# Patient Record
Sex: MALE | Race: WHITE | NOT HISPANIC OR LATINO | Employment: OTHER | URBAN - METROPOLITAN AREA
[De-identification: names, ages, dates, MRNs, and addresses within clinical notes are randomized per-mention and may not be internally consistent; named-entity substitution may affect disease eponyms.]

---

## 2017-01-03 ENCOUNTER — OFFICE VISIT (OUTPATIENT)
Dept: URGENT CARE | Facility: CLINIC | Age: 42
End: 2017-01-03
Payer: MEDICARE

## 2017-01-03 VITALS
HEIGHT: 72 IN | TEMPERATURE: 97 F | RESPIRATION RATE: 18 BRPM | OXYGEN SATURATION: 98 % | DIASTOLIC BLOOD PRESSURE: 80 MMHG | WEIGHT: 189.38 LBS | HEART RATE: 92 BPM | BODY MASS INDEX: 25.65 KG/M2 | SYSTOLIC BLOOD PRESSURE: 130 MMHG

## 2017-01-03 DIAGNOSIS — J40 SINOBRONCHITIS: Primary | ICD-10-CM

## 2017-01-03 DIAGNOSIS — J32.9 SINOBRONCHITIS: Primary | ICD-10-CM

## 2017-01-03 PROCEDURE — 99999 PR PBB SHADOW E&M-EST. PATIENT-LVL III: CPT | Mod: PBBFAC,,, | Performed by: PHYSICIAN ASSISTANT

## 2017-01-03 PROCEDURE — 99213 OFFICE O/P EST LOW 20 MIN: CPT | Mod: PBBFAC,PO | Performed by: PHYSICIAN ASSISTANT

## 2017-01-03 PROCEDURE — 99214 OFFICE O/P EST MOD 30 MIN: CPT | Mod: S$PBB,,, | Performed by: PHYSICIAN ASSISTANT

## 2017-01-03 RX ORDER — AMOXICILLIN AND CLAVULANATE POTASSIUM 875; 125 MG/1; MG/1
1 TABLET, FILM COATED ORAL 2 TIMES DAILY
Qty: 14 TABLET | Refills: 0 | Status: SHIPPED | OUTPATIENT
Start: 2017-01-03 | End: 2017-01-10

## 2017-01-03 NOTE — MR AVS SNAPSHOT
[facility]  [address] [facility]  [place] [address]  Phone: [phone]  Fax: [phone]                  Moris Palmer Jr.   1/3/2017 8:20 AM   Office Visit    Description:  Male : 1975   Provider:  Eleanor Schultz PA-C   Department:  [facility]           Reason for Visit     URI     Nasal Congestion           Diagnoses this Visit        Comments    Sinobronchitis    -  Primary            To Do List           Future Appointments        Provider Department Dept Phone    2017 8:10 AM PULMONARY LAB, [facility]- Pulmonary Function [facility] [phone]    2017 9:00 AM Yaritza Lucio NP [facility] - Pulmonary Services [phone]      Goals (5 Years of Data)     None      Follow-Up and Disposition     Return if symptoms worsen or fail to improve.       These Medications        Disp Refills Start End    amoxicillin-clavulanate 875-125mg (AUGMENTIN) 875-125 mg per tablet 14 tablet 0 1/3/2017 1/10/2017    Take 1 tablet by mouth 2 (two) times daily. - Oral    Pharmacy: [facility] [address] [place] [address] [address] AT [facility] OF [facility] Ph #: [phone]         [facility] On Call     [facility] On Call Nurse Care Line -  Assistance  Registered nurses in the [facility] On Call Center provide clinical advisement, health education, appointment booking, and other advisory services.  Call for this free service at [phone].             Medications           Message regarding Medications     Verify the changes and/or additions to your medication regime listed below are the same as discussed with your clinician today.  If any of these changes or additions are incorrect, please notify your healthcare provider.        START taking these NEW medications        Refills    amoxicillin-clavulanate 875-125mg (AUGMENTIN) 875-125 mg per tablet 0    Sig: Take 1 tablet by mouth 2 (two) times daily.    Class: Normal    Route: Oral            Verify that the below list of medications is an accurate representation of the medications you are currently taking.  If none reported, the list may be blank. If incorrect, please contact your healthcare provider. Carry this list with you in case of emergency.           Current Medications     albuterol 90 mcg/actuation inhaler Inhale 2 puffs into the lungs every 6 (six) hours as needed for Wheezing.    amitriptyline (ELAVIL) 25 MG tablet Take 25 mg by mouth nightly as needed for Insomnia.    baclofen (LIORESAL) 20 MG tablet Take 20 mg by mouth 3 (three) times daily.    buPROPion (WELLBUTRIN SR) 150 MG TBSR 12 hr tablet Take 1 tablet (150 mg total) by mouth 2 (two) times daily. Begin one daily x 3days, then one twice daily. Do not take 2nd dose after 6 pm.    fluticasone (FLONASE) 50 mcg/actuation nasal spray 2 sprays by Each Nare route once daily.    fluticasone-vilanterol (BREO) 200-25 mcg/dose DsDv diskus inhaler Inhale 1 puff into the lungs once daily.    gabapentin (NEURONTIN) 400 MG capsule Take 800 mg by mouth 3 (three) times daily.     losartan (COZAAR) 25 MG tablet TAKE 1 TABLET BY MOUTH EVERY DAY    NEXIUM 40 mg capsule TAKE ONE CAPSULE BY MOUTH DAILY    oxycodone-acetaminophen (PERCOCET)  mg per tablet Take 1 tablet by mouth every 4 (four) hours as needed for Pain.    tramadol (ULTRAM) 50 mg tablet Take 50 mg by mouth every 6 (six) hours as needed for Pain.    amoxicillin-clavulanate 875-125mg (AUGMENTIN) 875-125 mg per tablet Take 1 tablet by mouth 2 (two) times daily.           Clinical Reference Information           Vital Signs - Last Recorded  Most recent update: 1/3/2017  8:26 AM by Marcia Knight LPN    BP Pulse Temp Resp Ht Wt    130/80 (BP Location: Left arm, Patient Position: Sitting, BP Method: Manual) 92 97 °F (36.1 °C) (Tympanic) 18 6' (1.829 m) 85.9 kg (189 lb 6 oz)    SpO2 BMI             98% 25.68 kg/m2         Blood Pressure          Most Recent Value    BP  130/80       Allergies as of 1/3/2017     Asa [Aspirin]      Immunizations Administered on Date of Encounter - 1/3/2017     None      [facility] Sign-Up     Activating your [facility] account is as easy as 1-2-3!     1) Visit [email or web address], select Sign Up Now, enter this activation code and your date of birth, then select Next.  Activation code not generated  Current Patient Portal Status: Account disabled      2) Create a username and password to use when you visit [facility] in the future and select a security question in case you lose your password and select Next.    3) Enter your e-mail address and click Sign Up!    Additional Information  If you have questions, please e-mail [email or web address] or call [phone] to talk to our [facility] staff. Remember, [facility] is NOT to be used for urgent needs. For medical emergencies, dial 911.         Instructions      Bronchitis, Antibiotic Treatment (Adult)    Bronchitis is an infection of the air passages (bronchial tubes) in your lungs. It often occurs when you have a cold. This illness is contagious during the first few days and is spread through the air by coughing and sneezing, or by direct contact (touching the sick person and then touching your own eyes, nose, or mouth).  Symptoms of bronchitis include cough with mucus (phlegm) and low-grade fever. Bronchitis usually lasts 7 to 14 days. Mild cases can be treated with simple home remedies. More severe infection is treated with an antibiotic.  Home care  Follow these guidelines when caring for yourself at home:  · If your symptoms are severe, rest at home for the first 2 to 3 days. When you go back to your usual activities, don't let yourself get too tired.  · Do not smoke. Also avoid being exposed to secondhand smoke.  · You may use over-the-counter medicines to control fever or pain, unless another medicine was prescribed. (Note: If you have chronic liver or kidney disease or have ever had a stomach ulcer or  gastrointestinal bleeding, talk with your healthcare provider before using these medicines. Also talk to your provider if you are taking medicine to prevent blood clots.) Aspirin should never be given to anyone younger than 18 years of age who is ill with a viral infection or fever. It may cause severe liver or brain damage.  · Your appetite may be poor, so a light diet is fine. Avoid dehydration by drinking 6 to 8 glasses of fluids per day (such as water, soft drinks, sports drinks, juices, tea, or soup). Extra fluids will help loosen secretions in the nose and lungs.  · Over-the-counter cough, cold, and sore-throat medicines will not shorten the length of the illness, but they may be helpful to reduce symptoms. (Note: Do not use decongestants if you have high blood pressure.)  · Finish all antibiotic medicine. Do this even if you are feeling better after only a few days.  Follow-up care  Follow up with your healthcare provider, or as advised. If you had an X-ray or ECG (electrocardiogram), a specialist will review it. You will be notified of any new findings that may affect your care.  Note: If you are age 65 or older, or if you have a chronic lung disease or condition that affects your immune system, or you smoke, talk to your healthcare provider about having pneumococcal vaccinations and a yearly influenza vaccination (flu shot).  When to seek medical advice  Call your healthcare provider right away if any of these occur:  · Fever of 100.4°F (38°C) or higher  · Coughing up increased amounts of colored sputum  · Weakness, drowsiness, headache, facial pain, ear pain, or a stiff neck  Call 911, or get immediate medical care  Contact emergency services right away if any of these occur.  · Coughing up blood  · Worsening weakness, drowsiness, headache, or stiff neck  · Trouble breathing, wheezing, or pain with breathing  © 7170-1534 [employer]. [address] [place] [address], [place] [address]. All rights  reserved. This information is not intended as a substitute for professional medical care. Always follow your healthcare professional's instructions.      Complete all antibiotics as prescribed  Rest  Drink plenty of clear fluids--at least 64 ounces of water/juice  Normal saline nasal wash (example Ocean spray) to irrigate sinuses and for congestion/runny nose  Flonase or Nasonex to decrease inflammation  Tylenol or Ibuprofen for fever, headache and body aches  Mucinex to help thin secretions and reduce congestion  Cool mist humidifier/vaporizer  Warm salt water gargles for throat comfort  Chloraseptic spray or lozenges for throat comfort  Warm tea with honey  Practice good handwashing    See your PCP or go to ER if symptoms worsen or fail to improve with treatment.          Smoking Cessation     If you would like to quit smoking:   You may be eligible for free services if you are a [place] resident and started smoking cigarettes before September 1, 1988.  Call the Smoking Cessation [employer] (SCT) toll free at (316) 055-1278 or (819) 504-5896.   Call 9-800-QUIT-NOW if you do not meet the above criteria.

## 2017-01-03 NOTE — PROGRESS NOTES
Subjective:       Patient ID: Moris Palmer Jr. is a 41 y.o. male.    Chief Complaint: URI and Nasal Congestion    Cough   This is a recurrent problem. The current episode started 1 to 4 weeks ago (Had upper respiratory infection three weeks ago and completed antibiotics, was slowly improving but cough never resolved, now with worsening cough x 3 days with brown sputum). The problem has been rapidly worsening. The problem occurs constantly. The cough is productive of brown sputum. Associated symptoms include ear congestion, nasal congestion, postnasal drip, rhinorrhea and a sore throat. Pertinent negatives include no chest pain, chills, ear pain, fever, headaches, heartburn, hemoptysis, shortness of breath, sweats or wheezing (has not had to use his rescue inhaler). Risk factors for lung disease include smoking/tobacco exposure. He has tried nothing for the symptoms. The treatment provided no relief. His past medical history is significant for bronchitis and COPD (has mild obstruction, followed by lidya [facility]. At baseline does not have a chronic cough). There is no history of pneumonia.     Review of Systems   Constitutional: Negative for chills, fatigue, fever and unexpected weight change.   HENT: Positive for postnasal drip, rhinorrhea and sore throat. Negative for congestion, ear discharge, ear pain, sinus pressure, sneezing, trouble swallowing and voice change.    Eyes: Negative for pain and discharge.   Respiratory: Positive for cough. Negative for hemoptysis, chest tightness, shortness of breath and wheezing (has not had to use his rescue inhaler).    Cardiovascular: Negative for chest pain and leg swelling.   Gastrointestinal: Negative for abdominal pain, heartburn, nausea and vomiting.   Neurological: Negative for headaches.       Objective:      Visit Vitals    /80 (BP Location: Left arm, Patient Position: Sitting, BP Method: Manual)    Pulse 92    Temp 97 °F (36.1 °C) (Tympanic)     Resp 18    Ht 6' (1.829 m)    Wt 85.9 kg (189 lb 6 oz)    SpO2 98%    BMI 25.68 kg/m2     Physical Exam   Constitutional: He is oriented to person, place, and time. He appears well-developed and well-nourished. No distress.   HENT:   Head: Normocephalic and atraumatic.   Right Ear: External ear normal.   Left Ear: External ear normal.   Nose: Nose normal.   Mouth/Throat: Oropharynx is clear and moist. No oropharyngeal exudate.   Eyes: Conjunctivae and EOM are normal. Pupils are equal, round, and reactive to light.   Neck: Normal range of motion. Neck supple.   Cardiovascular: Normal rate, regular rhythm, normal heart sounds and intact distal pulses.  Exam reveals no gallop and no friction rub.    No murmur heard.  Pulmonary/Chest: Effort normal and breath sounds normal. No stridor. No respiratory distress. He has no wheezes. He has no rales. He exhibits no tenderness.   Lymphadenopathy:     He has no cervical adenopathy.   Neurological: He is alert and oriented to person, place, and time.   Skin: Skin is warm and dry. No rash noted. He is not diaphoretic.   Nursing note and vitals reviewed.      Assessment:       1. Sinobronchitis        Plan:       Sinobronchitis  -     amoxicillin-clavulanate 875-125mg (AUGMENTIN) 875-125 mg per tablet; Take 1 tablet by mouth 2 (two) times daily.  Dispense: 14 tablet; Refill: 0    Suspect developing sinusitis with concern for bacterial bronchitis given long duration and worsening sputum. No dyspnea or wheezing on exam, will hold off on steroids at this point. Patient advised to return to PCP if not noticing improvement in symptoms on antibiotics.    Complete all antibiotics as prescribed  Rest  Drink plenty of clear fluids--at least 64 ounces of water/juice  Normal saline nasal wash (example Ocean spray) to irrigate sinuses and for congestion/runny nose  Flonase or Nasonex to decrease inflammation  Tylenol or Ibuprofen for fever, headache and body aches  Mucinex to help thin  secretions and reduce congestion  Cool mist humidifier/vaporizer  Warm salt water gargles for throat comfort  Chloraseptic spray or lozenges for throat comfort  Warm tea with honey  Practice good handwashing    See your PCP or go to ER if symptoms worsen or fail to improve with treatment.         Heather Trant PA-C [facility]

## 2017-01-03 NOTE — PATIENT INSTRUCTIONS
Bronchitis, Antibiotic Treatment (Adult)    Bronchitis is an infection of the air passages (bronchial tubes) in your lungs. It often occurs when you have a cold. This illness is contagious during the first few days and is spread through the air by coughing and sneezing, or by direct contact (touching the sick person and then touching your own eyes, nose, or mouth).  Symptoms of bronchitis include cough with mucus (phlegm) and low-grade fever. Bronchitis usually lasts 7 to 14 days. Mild cases can be treated with simple home remedies. More severe infection is treated with an antibiotic.  Home care  Follow these guidelines when caring for yourself at home:  · If your symptoms are severe, rest at home for the first 2 to 3 days. When you go back to your usual activities, don't let yourself get too tired.  · Do not smoke. Also avoid being exposed to secondhand smoke.  · You may use over-the-counter medicines to control fever or pain, unless another medicine was prescribed. (Note: If you have chronic liver or kidney disease or have ever had a stomach ulcer or gastrointestinal bleeding, talk with your healthcare provider before using these medicines. Also talk to your provider if you are taking medicine to prevent blood clots.) Aspirin should never be given to anyone younger than 18 years of age who is ill with a viral infection or fever. It may cause severe liver or brain damage.  · Your appetite may be poor, so a light diet is fine. Avoid dehydration by drinking 6 to 8 glasses of fluids per day (such as water, soft drinks, sports drinks, juices, tea, or soup). Extra fluids will help loosen secretions in the nose and lungs.  · Over-the-counter cough, cold, and sore-throat medicines will not shorten the length of the illness, but they may be helpful to reduce symptoms. (Note: Do not use decongestants if you have high blood pressure.)  · Finish all antibiotic medicine. Do this even if you are feeling better after only a  few days.  Follow-up care  Follow up with your healthcare provider, or as advised. If you had an X-ray or ECG (electrocardiogram), a specialist will review it. You will be notified of any new findings that may affect your care.  Note: If you are age 65 or older, or if you have a chronic lung disease or condition that affects your immune system, or you smoke, talk to your healthcare provider about having pneumococcal vaccinations and a yearly influenza vaccination (flu shot).  When to seek medical advice  Call your healthcare provider right away if any of these occur:  · Fever of 100.4°F (38°C) or higher  · Coughing up increased amounts of colored sputum  · Weakness, drowsiness, headache, facial pain, ear pain, or a stiff neck  Call 911, or get immediate medical care  Contact emergency services right away if any of these occur.  · Coughing up blood  · Worsening weakness, drowsiness, headache, or stiff neck  · Trouble breathing, wheezing, or pain with breathing  © 9691-4931 [employer]. [address] [place] [address]. All rights reserved. This information is not intended as a substitute for professional medical care. Always follow your healthcare professional's instructions.      Complete all antibiotics as prescribed  Rest  Drink plenty of clear fluids--at least 64 ounces of water/juice  Normal saline nasal wash (example Ocean spray) to irrigate sinuses and for congestion/runny nose  Flonase or Nasonex to decrease inflammation  Tylenol or Ibuprofen for fever, headache and body aches  Mucinex to help thin secretions and reduce congestion  Cool mist humidifier/vaporizer  Warm salt water gargles for throat comfort  Chloraseptic spray or lozenges for throat comfort  Warm tea with honey  Practice good handwashing    See your PCP or go to ER if symptoms worsen or fail to improve with treatment.

## 2017-02-15 ENCOUNTER — OFFICE VISIT (OUTPATIENT)
Dept: URGENT CARE | Facility: CLINIC | Age: 42
End: 2017-02-15
Payer: MEDICARE

## 2017-02-15 VITALS
HEIGHT: 72 IN | WEIGHT: 190.06 LBS | SYSTOLIC BLOOD PRESSURE: 150 MMHG | DIASTOLIC BLOOD PRESSURE: 80 MMHG | BODY MASS INDEX: 25.74 KG/M2 | TEMPERATURE: 98 F

## 2017-02-15 DIAGNOSIS — M54.41 CHRONIC BILATERAL LOW BACK PAIN WITH RIGHT-SIDED SCIATICA: Primary | ICD-10-CM

## 2017-02-15 DIAGNOSIS — G89.29 CHRONIC BILATERAL LOW BACK PAIN WITH RIGHT-SIDED SCIATICA: Primary | ICD-10-CM

## 2017-02-15 PROCEDURE — 99213 OFFICE O/P EST LOW 20 MIN: CPT | Mod: PBBFAC,PO | Performed by: NURSE PRACTITIONER

## 2017-02-15 PROCEDURE — 99213 OFFICE O/P EST LOW 20 MIN: CPT | Mod: S$PBB,,, | Performed by: NURSE PRACTITIONER

## 2017-02-15 PROCEDURE — 96372 THER/PROPH/DIAG INJ SC/IM: CPT | Mod: PBBFAC,PO

## 2017-02-15 PROCEDURE — 99999 PR PBB SHADOW E&M-EST. PATIENT-LVL III: CPT | Mod: PBBFAC,,, | Performed by: NURSE PRACTITIONER

## 2017-02-15 RX ORDER — KETOROLAC TROMETHAMINE 30 MG/ML
60 INJECTION, SOLUTION INTRAMUSCULAR; INTRAVENOUS
Status: COMPLETED | OUTPATIENT
Start: 2017-02-15 | End: 2017-02-15

## 2017-02-15 RX ADMIN — KETOROLAC TROMETHAMINE 60 MG: 60 INJECTION, SOLUTION INTRAMUSCULAR at 04:02

## 2017-02-15 NOTE — PROGRESS NOTES
Subjective:       Patient ID: Moris Palmer Jr. is a 41 y.o. male.    Chief Complaint: Back Pain    HPI Comments: Denies recent injury or trauma.    Back Pain   This is a chronic problem. Episode onset: many years, but seems to have increased over last 48 hours. The problem occurs constantly. The problem has been gradually worsening since onset. The pain is present in the lumbar spine. The quality of the pain is described as aching. The pain radiates to the right knee. The pain is at a severity of 8/10. The symptoms are aggravated by bending, twisting, standing and position. Associated symptoms include leg pain. Pertinent negatives include no abdominal pain, bladder incontinence, bowel incontinence, chest pain, dysuria, fever, headaches, numbness, paresis, paresthesias, pelvic pain, perianal numbness, tingling, weakness or weight loss. Risk factors: chronic back pain, has had back surgeries in the past. Treatments tried: took 3 percoet today, also took 2 tramadol, baclofen, neurontin, and mobic. The treatment provided no relief.     Review of Systems   Constitutional: Positive for activity change. Negative for appetite change, chills, diaphoresis, fatigue, fever, unexpected weight change and weight loss.   HENT: Negative.    Eyes: Negative.    Respiratory: Negative for apnea, chest tightness, shortness of breath and stridor.    Cardiovascular: Negative for chest pain, palpitations and leg swelling.   Gastrointestinal: Negative.  Negative for abdominal pain and bowel incontinence.   Endocrine: Negative.    Genitourinary: Negative.  Negative for bladder incontinence, dysuria, flank pain, hematuria and pelvic pain.   Musculoskeletal: Positive for back pain. Negative for arthralgias, gait problem, joint swelling, myalgias, neck pain and neck stiffness.   Skin: Negative for color change, pallor, rash and wound.   Allergic/Immunologic: Negative.    Neurological: Negative for dizziness, tingling, facial asymmetry,  weakness, light-headedness, numbness, headaches and paresthesias.   Hematological: Negative for adenopathy.   Psychiatric/Behavioral: Negative for agitation and behavioral problems.       Objective:      Physical Exam   Constitutional: He is oriented to person, place, and time. He appears well-developed and well-nourished. No distress.   HENT:   Head: Normocephalic and atraumatic.   Cardiovascular: Normal rate, regular rhythm and normal heart sounds.    Pulmonary/Chest: Effort normal and breath sounds normal. No respiratory distress.   Musculoskeletal:        Lumbar back: He exhibits decreased range of motion, tenderness and pain. He exhibits no bony tenderness, no swelling, no edema, no deformity, no laceration, no spasm and normal pulse.        Back:    TTP to area of pain per drawing.  No swelling, erythema or rash.  No numbness or tingling.  Negative straight leg raise bilaterally. Midline surgical scar noted.     Neurological: He is alert and oriented to person, place, and time.   Skin: Skin is warm and dry. No rash noted. He is not diaphoretic.   Psychiatric: He has a normal mood and affect. His behavior is normal. Judgment and thought content normal.   Nursing note and vitals reviewed.      Assessment:       1. Chronic bilateral low back pain with right-sided sciatica        Plan:       Moris was seen today for back pain.    Diagnoses and all orders for this visit:    Chronic bilateral low back pain with right-sided sciatica  -     ketorolac injection 60 mg; Inject 2 mLs (60 mg total) into the muscle one time.    Patient on narcotic meds prescribed by Dr. Combs, he takes tramadol, percocet 10, gabapentin, baclofen and mobic. I explained to patient that we can give him a toradol injection today x 1 but he needs to follow up with Dr. Combs who prescribes these medications for further evaluation of his back pain. Patient declined physical therapy prescription today. ER if worse. No x ray indicated today as his  pain is chronic and there is no injury or trauma prior to back pain increasing in intensity 2 days ago.

## 2017-02-15 NOTE — MR AVS SNAPSHOT
[facility]  [address] [place] [address]  Phone: [phone]  Fax: [phone]                  Moris Palmer Jr.   2/15/2017 4:10 PM   Office Visit    Description:  Male : 1975   Provider:  JUAREZ Hay   Department:  [place] [facility]           Reason for Visit     Back Pain           Diagnoses this Visit        Comments    Chronic bilateral low back pain with right-sided sciatica    -  Primary            To Do List           Goals (5 Years of Data)     None      Follow-Up and Disposition     Return if symptoms worsen or fail to improve.      [facility]     [facility] On Call Nurse Care Line -  Assistance  Registered nurses in the [facility] Call Center provide clinical advisement, health education, appointment booking, and other advisory services.  Call for this free service at [phone].             Medications           Message regarding Medications     Verify the changes and/or additions to your medication regime listed below are the same as discussed with your clinician today.  If any of these changes or additions are incorrect, please notify your healthcare provider.        These medications were administered today        Dose Freq    ketorolac injection 60 mg 60 mg Clinic/HOD 1 time    Sig: Inject 2 mLs (60 mg total) into the muscle one time.    Class: Normal    Route: Intramuscular      STOP taking these medications     fluticasone (FLONASE) 50 mcg/actuation nasal spray 2 sprays by Each Nare route once daily.           Verify that the below list of medications is an accurate representation of the medications you are currently taking.  If none reported, the list may be blank. If incorrect, please contact your healthcare provider. Carry this list with you in case of emergency.           Current Medications     albuterol 90 mcg/actuation inhaler Inhale 2 puffs into the lungs every 6 (six) hours as needed for Wheezing.     amitriptyline (ELAVIL) 25 MG tablet Take 25 mg by mouth nightly as needed for Insomnia.    baclofen (LIORESAL) 20 MG tablet Take 20 mg by mouth 3 (three) times daily.    buPROPion (WELLBUTRIN SR) 150 MG TBSR 12 hr tablet Take 1 tablet (150 mg total) by mouth 2 (two) times daily. Begin one daily x 3days, then one twice daily. Do not take 2nd dose after 6 pm.    fluticasone-vilanterol (BREO) 200-25 mcg/dose DsDv diskus inhaler Inhale 1 puff into the lungs once daily.    gabapentin (NEURONTIN) 400 MG capsule Take 800 mg by mouth 3 (three) times daily.     losartan (COZAAR) 25 MG tablet TAKE 1 TABLET BY MOUTH EVERY DAY    NEXIUM 40 mg capsule TAKE ONE CAPSULE BY MOUTH DAILY    oxycodone-acetaminophen (PERCOCET)  mg per tablet Take 1 tablet by mouth every 4 (four) hours as needed for Pain.    tramadol (ULTRAM) 50 mg tablet Take 50 mg by mouth every 6 (six) hours as needed for Pain.           Clinical Reference Information           Your Vitals Were     BP Temp Height Weight BMI    150/80 (BP Location: Left arm, Patient Position: Sitting, BP Method: Manual) 98.1 °F (36.7 °C) (Tympanic) 6' (1.829 m) 86.2 kg (190 lb 0.6 oz) 25.77 kg/m2      Blood Pressure          Most Recent Value    BP  (!)  150/80      Allergies as of 2/15/2017     Asa [Aspirin]      Immunizations Administered on Date of Encounter - 2/15/2017     None      [facility] Sign-Up     Activating your [facility] account is as easy as 1-2-3!     1) Visit [email or web address], select Sign Up Now, enter this activation code and your date of birth, then select Next.  Activation code not generated  Current Patient Portal Status: Account disabled      2) Create a username and password to use when you visit [facility] in the future and select a security question in case you lose your password and select Next.    3) Enter your e-mail address and click Sign Up!    Additional Information  If you have questions, please e-mail [email or web address] or call [phone] to  talk to our [facility] staff. Remember, [facility] is NOT to be used for urgent needs. For medical emergencies, dial 911.         Instructions    Follow up with Dr. Combs for further evaluation of chronic back pain  ER if worse       Smoking Cessation     If you would like to quit smoking:   You may be eligible for free services if you are a [place] resident and started smoking cigarettes before September 1, 1988.  Call the Smoking Cessation Trust ([facility]) toll free at (393) 663-7373 or (007) 062-7468.   Call 1-800-QUIT-NOW if you do not meet the above criteria.            Language Assistance Services     ATTENTION: Language assistance services are available, free of charge. Please call [phone].      ATENCIÓN: Si habla marjan, tiene a gusman disposición servicios gratuitos de asistencia lingüística. Llame al [phone].     CHÚ Ý: N?u b?n nói Ti?ng Vi?t, có các d?ch v? h? tr? ngôn ng? mi?n phí dành cho b?n. G?i s? [phone].         [facility] complies with applicable Federal civil rights laws and does not discriminate on the basis of race, color, national origin, age, disability, or sex.

## 2017-02-20 ENCOUNTER — PATIENT OUTREACH (OUTPATIENT)
Dept: ADMINISTRATIVE | Facility: HOSPITAL | Age: 42
End: 2017-02-20

## 2017-02-20 DIAGNOSIS — Z13.29 SCREENING FOR THYROID DISORDER: ICD-10-CM

## 2017-02-20 DIAGNOSIS — I10 ESSENTIAL HYPERTENSION: Primary | ICD-10-CM

## 2017-02-20 NOTE — LETTER
February 20, 2017    Moris Palmer JrMegha  [address]  [place] [address]             [facility]  [address]  [place] [address]  Phone: [phone] Dear Mr. Palmer:       [facility] is committed to your overall health.  To help you get the most out of each of your visits, we will review your information to make sure you are up to date on all of your recommended tests and/or procedures.      Sascha Tilley MD has found that you may be due for   Health Maintenance Due   Topic    Pneumococcal PPSV23 (Medium Risk) (1)    Influenza Vaccine     Lipid Panel         If you have had any of the above done at another facility, please bring the records or information with you so that your record at [facility] will be complete.    If you are currently taking medication, please bring it with you to your appointment for review.    We will be happy to assist you with scheduling any necessary appointments or you may contact the [facility] appointment desk at [phone] to schedule at your convenience.     Thank you for choosing [facility] for your healthcare needs,    Luzmaria COLON LPN  Care Coordination Department  [facility]  [phone]

## 2017-02-20 NOTE — PROGRESS NOTES
CHART AUDIT COMPLETED. OVERDUE HEALTH MAINTENANCE LETTER MAILED TO PATIENT. ORDERS ENTERED VIA WOG.

## 2017-03-20 ENCOUNTER — OFFICE VISIT (OUTPATIENT)
Dept: INTERNAL MEDICINE | Facility: CLINIC | Age: 42
End: 2017-03-20
Payer: MEDICARE

## 2017-03-20 ENCOUNTER — LAB VISIT (OUTPATIENT)
Dept: LAB | Facility: HOSPITAL | Age: 42
End: 2017-03-20
Attending: FAMILY MEDICINE
Payer: MEDICARE

## 2017-03-20 VITALS
DIASTOLIC BLOOD PRESSURE: 88 MMHG | HEIGHT: 72 IN | BODY MASS INDEX: 25.56 KG/M2 | HEART RATE: 84 BPM | TEMPERATURE: 98 F | WEIGHT: 188.69 LBS | SYSTOLIC BLOOD PRESSURE: 120 MMHG

## 2017-03-20 DIAGNOSIS — I10 ESSENTIAL HYPERTENSION: ICD-10-CM

## 2017-03-20 DIAGNOSIS — Z13.29 SCREENING FOR THYROID DISORDER: ICD-10-CM

## 2017-03-20 DIAGNOSIS — K21.9 GASTROESOPHAGEAL REFLUX DISEASE, ESOPHAGITIS PRESENCE NOT SPECIFIED: Primary | ICD-10-CM

## 2017-03-20 LAB
ALBUMIN SERPL BCP-MCNC: 3.8 G/DL
ALP SERPL-CCNC: 55 U/L
ALT SERPL W/O P-5'-P-CCNC: 15 U/L
ANION GAP SERPL CALC-SCNC: 8 MMOL/L
AST SERPL-CCNC: 15 U/L
BILIRUB SERPL-MCNC: 0.5 MG/DL
BUN SERPL-MCNC: 19 MG/DL
CALCIUM SERPL-MCNC: 8.8 MG/DL
CHLORIDE SERPL-SCNC: 109 MMOL/L
CHOLEST/HDLC SERPL: 6.2 {RATIO}
CO2 SERPL-SCNC: 24 MMOL/L
CREAT SERPL-MCNC: 1 MG/DL
EST. GFR  (AFRICAN AMERICAN): >60 ML/MIN/1.73 M^2
EST. GFR  (NON AFRICAN AMERICAN): >60 ML/MIN/1.73 M^2
GLUCOSE SERPL-MCNC: 97 MG/DL
HDL/CHOLESTEROL RATIO: 16.1 %
HDLC SERPL-MCNC: 242 MG/DL
HDLC SERPL-MCNC: 39 MG/DL
LDLC SERPL CALC-MCNC: 177 MG/DL
NONHDLC SERPL-MCNC: 203 MG/DL
POTASSIUM SERPL-SCNC: 4.7 MMOL/L
PROT SERPL-MCNC: 6.8 G/DL
SODIUM SERPL-SCNC: 141 MMOL/L
TRIGL SERPL-MCNC: 130 MG/DL
TSH SERPL DL<=0.005 MIU/L-ACNC: 1.09 UIU/ML

## 2017-03-20 PROCEDURE — 80061 LIPID PANEL: CPT

## 2017-03-20 PROCEDURE — 99213 OFFICE O/P EST LOW 20 MIN: CPT | Mod: S$PBB,,, | Performed by: PHYSICIAN ASSISTANT

## 2017-03-20 PROCEDURE — 85025 COMPLETE CBC W/AUTO DIFF WBC: CPT

## 2017-03-20 PROCEDURE — 36415 COLL VENOUS BLD VENIPUNCTURE: CPT | Mod: PO

## 2017-03-20 PROCEDURE — 99999 PR PBB SHADOW E&M-EST. PATIENT-LVL III: CPT | Mod: PBBFAC,,, | Performed by: PHYSICIAN ASSISTANT

## 2017-03-20 PROCEDURE — 84443 ASSAY THYROID STIM HORMONE: CPT

## 2017-03-20 PROCEDURE — 80053 COMPREHEN METABOLIC PANEL: CPT

## 2017-03-20 RX ORDER — LOSARTAN POTASSIUM 25 MG/1
25 TABLET ORAL DAILY
Qty: 90 TABLET | Refills: 0 | Status: SHIPPED | OUTPATIENT
Start: 2017-03-20 | End: 2017-08-17 | Stop reason: SDUPTHER

## 2017-03-20 RX ORDER — PANTOPRAZOLE SODIUM 40 MG/1
40 TABLET, DELAYED RELEASE ORAL DAILY
Qty: 90 TABLET | Refills: 0 | Status: SHIPPED | OUTPATIENT
Start: 2017-03-20 | End: 2017-08-17 | Stop reason: SDUPTHER

## 2017-03-20 RX ORDER — GABAPENTIN 800 MG/1
1 TABLET ORAL 3 TIMES DAILY
Refills: 2 | COMMUNITY
Start: 2017-02-10

## 2017-03-20 NOTE — PROGRESS NOTES
Subjective:       Patient ID: Moris Palmer Jr. is a 41 y.o. male.    Chief Complaint: Annual Exam (refill meds)    HPI   Discontinued  wellbutrin . Was on it to help him quit smoking, did not help him so he discontinued the medication.   Diet: Diet ok. Generally healthy.  Exercise: walks   Hasn't been on his losartan due to no refills. Blood pressure at home has been running 140s/90s or greater. Denies headaches, blurred vision, or chest pain or SOB.   On breo for chronic cough, which helps.     Patient Active Problem List   Diagnosis    Essential hypertension - blood pressure doing great.     Chronic low back pain- See's Dr. Miner for this. Doing ok on current medications.    GERD: on nexium, but states it does not help with his reflux. Has had reflux for years. Has not visited GI.     Health Maintenance   Topic Date Due    Pneumococcal PPSV23 (Medium Risk) (1) 12/17/1993    Influenza Vaccine  08/01/2016    Lipid Panel  11/06/2016    TETANUS VACCINE  11/30/2021     Review of Systems   Constitutional: Negative for activity change, appetite change, chills, diaphoresis, fatigue, fever and unexpected weight change.   HENT: Negative.  Negative for congestion, hearing loss, postnasal drip, rhinorrhea, sore throat, trouble swallowing and voice change.    Eyes: Negative.  Negative for visual disturbance.   Respiratory: Negative.  Negative for cough, choking, chest tightness and shortness of breath.    Cardiovascular: Negative for chest pain, palpitations and leg swelling.   Gastrointestinal: Negative for abdominal distention, abdominal pain, blood in stool, constipation, diarrhea, nausea and vomiting.   Endocrine: Negative for cold intolerance, heat intolerance, polydipsia and polyuria.   Genitourinary: Negative.  Negative for difficulty urinating and frequency.   Musculoskeletal: Negative for arthralgias, back pain, gait problem, joint swelling and myalgias.   Skin: Negative for color change, pallor, rash  and wound.   Neurological: Negative for dizziness, tremors, weakness, light-headedness, numbness and headaches.   Hematological: Negative for adenopathy.   Psychiatric/Behavioral: Negative for behavioral problems, confusion, self-injury, sleep disturbance and suicidal ideas. The patient is not nervous/anxious.          /88  Pulse 84  Temp 97.5 °F (36.4 °C) (Tympanic)   Ht 6' (1.829 m)  Wt 85.6 kg (188 lb 11.4 oz)  BMI 25.59 kg/m2  Objective:      Physical Exam   Constitutional: He is oriented to person, place, and time. He appears well-developed and well-nourished.   HENT:   Head: Normocephalic and atraumatic.   Right Ear: External ear normal.   Left Ear: External ear normal.   Nose: Nose normal.   Mouth/Throat: Oropharynx is clear and moist.   Eyes: Conjunctivae and EOM are normal. Pupils are equal, round, and reactive to light.   Neck: Normal range of motion. Neck supple.   Cardiovascular: Normal rate, regular rhythm and normal heart sounds.  Exam reveals no gallop and no friction rub.    No murmur heard.  Pulmonary/Chest: Effort normal and breath sounds normal. No respiratory distress. He has no wheezes. He has no rales. He exhibits no tenderness.   Abdominal: Soft. He exhibits no distension. There is no tenderness.   Musculoskeletal: Normal range of motion.   Lymphadenopathy:     He has no cervical adenopathy.   Neurological: He is alert and oriented to person, place, and time.   Skin: Skin is warm and dry.   Psychiatric: He has a normal mood and affect. His behavior is normal. Judgment and thought content normal.   Vitals reviewed.        Assessment:       1. Gastroesophageal reflux disease, esophagitis presence not specified    2. Essential hypertension        Plan:   Labs to be completed today: TSH, CBC, CMP, lipids    Gastroesophageal reflux disease, esophagitis presence not specified  -     pantoprazole (PROTONIX) 40 MG tablet; Take 1 tablet (40 mg total) by mouth once daily.  Dispense: 90  tablet; Refill: 0  -discontinue nexium, will try protonix. -if no improvement, will need EGD    Essential hypertension  -     losartan (COZAAR) 25 MG tablet; Take 1 tablet (25 mg total) by mouth once daily.  Dispense: 90 tablet; Refill: 0  -monitor blood pressure at home. GOAL is under 140/90. If start getting dizzy when going from sitting to standing, RTC.     Return in about 6 months (around 9/20/2017), or if symptoms worsen or fail to improve.

## 2017-03-20 NOTE — MR AVS SNAPSHOT
[facility]  [address]  [place] [address]  Phone: [phone]  Fax: [phone]                  Moris Palmer Jr.   3/20/2017 8:00 AM   Office Visit    Description:  Male : 1975   Provider:  Isabella Bolton PA-C   [facility]           Reason for Visit     Annual Exam           Diagnoses this Visit        Comments    Gastroesophageal reflux disease, esophagitis presence not specified    -  Primary     Essential hypertension                To Do List           Future Appointments        Provider Department Dept Phone    3/20/2017 10:00 AM LABORATORY, [place] [facility] [phone]    2017 7:40 AM Sascha Tilley MD [facility] [phone]      Goals (5 Years of Data)     None      Follow-Up and Disposition     Return in about 6 months (around 2017), or if symptoms worsen or fail to improve.       These Medications        Disp Refills Start End    pantoprazole (PROTONIX) 40 MG tablet 90 tablet 0 3/20/2017 2017    Take 1 tablet (40 mg total) by mouth once daily. - Oral    Pharmacy: [facility] [id number] [place] [address] [address] [facility] Ph #: [phone]       losartan (COZAAR) 25 MG tablet 90 tablet 0 3/20/2017 2017    Take 1 tablet (25 mg total) by mouth once daily. - Oral    Pharmacy: [facility] [address] [place], [place] - [address] [address] [facility] Ph #: [phone]         [facility] On Call     [facility] On Call Nurse Care Line -  Assistance  Registered nurses in the [facility] Center provide clinical advisement, health education, appointment booking, and other advisory services.  Call for this free service at [phone].             Medications           Message regarding Medications     Verify the changes and/or additions to your medication regime  listed below are the same as discussed with your clinician today.  If any of these changes or additions are incorrect, please notify your healthcare provider.        START taking these NEW medications        Refills    pantoprazole (PROTONIX) 40 MG tablet 0    Sig: Take 1 tablet (40 mg total) by mouth once daily.    Class: Normal    Route: Oral      CHANGE how you are taking these medications     Start Taking Instead of    losartan (COZAAR) 25 MG tablet losartan (COZAAR) 25 MG tablet    Dosage:  Take 1 tablet (25 mg total) by mouth once daily. Dosage:  TAKE 1 TABLET BY MOUTH EVERY DAY    Reason for Change:  Reorder       STOP taking these medications     gabapentin (NEURONTIN) 400 MG capsule Take 800 mg by mouth 3 (three) times daily.     NEXIUM 40 mg capsule TAKE ONE CAPSULE BY MOUTH DAILY           Verify that the below list of medications is an accurate representation of the medications you are currently taking.  If none reported, the list may be blank. If incorrect, please contact your healthcare provider. Carry this list with you in case of emergency.           Current Medications     albuterol 90 mcg/actuation inhaler Inhale 2 puffs into the lungs every 6 (six) hours as needed for Wheezing.    amitriptyline (ELAVIL) 25 MG tablet Take 25 mg by mouth nightly as needed for Insomnia.    baclofen (LIORESAL) 20 MG tablet Take 20 mg by mouth 3 (three) times daily.    buPROPion (WELLBUTRIN SR) 150 MG TBSR 12 hr tablet Take 1 tablet (150 mg total) by mouth 2 (two) times daily. Begin one daily x 3days, then one twice daily. Do not take 2nd dose after 6 pm.    fluticasone-vilanterol (BREO) 200-25 mcg/dose DsDv diskus inhaler Inhale 1 puff into the lungs once daily.    gabapentin (NEURONTIN) 800 MG tablet Take 1 tablet by mouth 3 (three) times daily.    losartan (COZAAR) 25 MG tablet Take 1 tablet (25 mg total) by mouth once daily.    oxycodone-acetaminophen (PERCOCET)  mg per tablet Take 1 tablet by mouth every 4  (four) hours as needed for Pain.    pantoprazole (PROTONIX) 40 MG tablet Take 1 tablet (40 mg total) by mouth once daily.    tramadol (ULTRAM) 50 mg tablet Take 50 mg by mouth every 6 (six) hours as needed for Pain.           Clinical Reference Information           Your Vitals Were     BP Pulse Temp Height Weight BMI    120/88 84 97.5 °F (36.4 °C) (Tympanic) 6' (1.829 m) 85.6 kg (188 lb 11.4 oz) 25.59 kg/m2      Blood Pressure          Most Recent Value    BP  120/88      Allergies as of 3/20/2017     Asa [Aspirin]      Immunizations Administered on Date of Encounter - 3/20/2017     None      [facility] Sign-Up     Activating your [facility] account is as easy as 1-2-3!     1) Visit [email or web address], select Sign Up Now, enter this activation code and your date of birth, then select Next.  Activation code not generated  Current Patient Portal Status: Account disabled      2) Create a username and password to use when you visit [facility] in the future and select a security question in case you lose your password and select Next.    3) Enter your e-mail address and click Sign Up!    Additional Information  If you have questions, please e-mail [email or web address] or call [phone] to talk to our [facility] staff. Remember, [facility] is NOT to be used for urgent needs. For medical emergencies, dial 911.         Instructions      Metabolic Syndrome: Lowering Your Blood Pressure    Metabolic syndrome is a set of five health factors that can lead to serious health problems. The factors greatly increase your risk for diabetes, heart attack, or stroke. High blood pressure (hypertension) is one of the factors of metabolic syndrome.  What is high blood pressure?  High blood pressure is when the force of blood against the inside of your blood vessels is higher than it should be. This makes your heart work harder. And it may damage your blood vessels. High blood pressure means a level of 130 mmHg/85 mm Hg or more.  Your  healthcare provider will usually check your blood pressure each time you have an office visit. Having a high reading at one office visit does not mean that you have high blood pressure. High blood pressure means that your blood pressure is high over a period of time. That is why your healthcare provider checks it at each office visit. He or she can then look at the pattern of your blood pressure. Try to arrive early enough to your appointment so you can rest before your blood pressure is taken. Avoid caffeine and smoking before your pressure is measured.   Making lifestyle changes  Lifestyle changes can help lower your blood pressure. These changes can include:  · Losing weight. Even a small amount of weight loss can lower your blood pressure. As you slowly lose weight, you may see your blood pressure gradually get lower.  · Quitting smoking. The nicotine in tobacco raises blood pressure. Smoking also increases the risk for other heart and blood vessel diseases, many cancers, and most lung conditions. The first step is to set a quit date. Talk with your healthcare provider about ways to quit smoking. There are programs and medicines that can help.  · Eating healthy. Eat plenty of fruits and vegetables. Eat fat-free or low-fat dairy foods, and drink less alcohol.  · Eating less salt (sodium). Salt can raise blood pressure. Try salt-free seasoning, or herbs and spices. Choose low-salt or no-salt snacks, deli meats, and canned foods.  · Being more physically active. Physical activity can help lower blood pressure. Get at least 30 minutes of activity on most days. If you are not very active, talk with your healthcare provider before you get started. He or she can help you figure out what is best for you.  · Managing stress. Stress can raise blood pressure. Talk with your healthcare provider about lowering your stress level. He or she may advise relaxation methods, a counselor, health , or class.  Taking  medicine  Your healthcare provider may also prescribe medicine to help lower your blood pressure. The medicine will help lessen the strain on your heart and help to protect your blood vessels. If you lose weight, you may be able to take less medicine. Or you may no longer need to take it.  Date Last Reviewed: 7/1/2016 © 2000-2016 [employer]. [address] [place] [address], [place] [address]. All rights reserved. This information is not intended as a substitute for professional medical care. Always follow your healthcare professional's instructions.        Chronic Lung Disease: Tips for Quitting Smoking  Cigarette smoke damages lung tissue and irritates airways, which makes breathing harder. Smoking also damages cilia (tiny hairs) in the airways, so the cilia cant do their job of clearing mucus, dirt, and germs from the lungs. Its never too late to quit smoking. Your health will start to improve on the same day you stub out your last cigarette.    You dont have to quit alone  You may be more likely to quit for good if you seek support from others.  · Talk with your healthcare provider about your plans to quit. Ask about medicines that can help. Some contain nicotine and some do not. Some are available by prescription. You can buy others over-the-counter. These medicines help control the desire to use tobacco and the uncomfortable symptoms people have when they try to quit. Others gradually lessen the level of nicotine in the body. Your healthcare provider can tell you about all the choices available including:  ¨ Oral medicines such as bupropion or varenicline  ¨ Nicotine replacement therapy such as gum, lozenge, a patch, inhaler, or nasal spray  · Join a support group or get advice from an ex-smoker.  · Ask other smokers in your household to quit with you.   Tips for quitting smoking  There isnt one right way to stop smoking. Everyone quits in his or her own way. Some of these tips may help:  · Make a  list of reasons you want to quit. Keep this list and read it often.  · Pick a date to quit smoking. Then stick to it.  · List the things that make you want to smoke. Think of ways to avoid these triggers.  · Set goals for yourself, such as going for a week without smoking. Reward yourself when you meet your goals.  · If you dont quit the first time, keep trying! Many people have to try more than once before they stop smoking for good.     For more information  · smokefree.gov/ynsf-ny-jf-expert  · National [employer] [place] Smoking Quitline: 877-44U-QUIT (769-996-4470)   Date Last Reviewed: 5/1/2016  © 1293-9000 [employer]. [address] [place] [address], [place] [address]. All rights reserved. This information is not intended as a substitute for professional medical care. Always follow your healthcare professional's instructions.             Smoking Cessation     If you would like to quit smoking:   You may be eligible for free services if you are a [place] resident and started smoking cigarettes before September 1, 1988.  Call the Smoking Cessation Trust ([employer]) toll free at (894) 700-2389 or (574) 187-1672.   Call 1-800-QUIT-NOW if you do not meet the above criteria.            Language Assistance Services     ATTENTION: Language assistance services are available, free of charge. Please call [phone].      ATENCIÓN: Si habla español, tiene a gusman disposición servicios gratuitos de asistencia lingüística. Llame al [phone].     CHÚ Ý: N?u b?n nói Ti?ng Vi?t, có các d?ch v? h? tr? ngôn ng? mi?n phí dành cho b?n. G?i s? [phone].         [facility] complies with applicable Federal civil rights laws and does not discriminate on the basis of race, color, national origin, age, disability, or sex.

## 2017-03-20 NOTE — PATIENT INSTRUCTIONS
Metabolic Syndrome: Lowering Your Blood Pressure    Metabolic syndrome is a set of five health factors that can lead to serious health problems. The factors greatly increase your risk for diabetes, heart attack, or stroke. High blood pressure (hypertension) is one of the factors of metabolic syndrome.  What is high blood pressure?  High blood pressure is when the force of blood against the inside of your blood vessels is higher than it should be. This makes your heart work harder. And it may damage your blood vessels. High blood pressure means a level of 130 mmHg/85 mm Hg or more.  Your healthcare provider will usually check your blood pressure each time you have an office visit. Having a high reading at one office visit does not mean that you have high blood pressure. High blood pressure means that your blood pressure is high over a period of time. That is why your healthcare provider checks it at each office visit. He or she can then look at the pattern of your blood pressure. Try to arrive early enough to your appointment so you can rest before your blood pressure is taken. Avoid caffeine and smoking before your pressure is measured.   Making lifestyle changes  Lifestyle changes can help lower your blood pressure. These changes can include:  · Losing weight. Even a small amount of weight loss can lower your blood pressure. As you slowly lose weight, you may see your blood pressure gradually get lower.  · Quitting smoking. The nicotine in tobacco raises blood pressure. Smoking also increases the risk for other heart and blood vessel diseases, many cancers, and most lung conditions. The first step is to set a quit date. Talk with your healthcare provider about ways to quit smoking. There are programs and medicines that can help.  · Eating healthy. Eat plenty of fruits and vegetables. Eat fat-free or low-fat dairy foods, and drink less alcohol.  · Eating less salt (sodium). Salt can raise blood pressure. Try  salt-free seasoning, or herbs and spices. Choose low-salt or no-salt snacks, deli meats, and canned foods.  · Being more physically active. Physical activity can help lower blood pressure. Get at least 30 minutes of activity on most days. If you are not very active, talk with your healthcare provider before you get started. He or she can help you figure out what is best for you.  · Managing stress. Stress can raise blood pressure. Talk with your healthcare provider about lowering your stress level. He or she may advise relaxation methods, a counselor, health , or class.  Taking medicine  Your healthcare provider may also prescribe medicine to help lower your blood pressure. The medicine will help lessen the strain on your heart and help to protect your blood vessels. If you lose weight, you may be able to take less medicine. Or you may no longer need to take it.  Date Last Reviewed: 7/1/2016  © 1241-0627 [employer]. [address] [place] [address]. All rights reserved. This information is not intended as a substitute for professional medical care. Always follow your healthcare professional's instructions.        Chronic Lung Disease: Tips for Quitting Smoking  Cigarette smoke damages lung tissue and irritates airways, which makes breathing harder. Smoking also damages cilia (tiny hairs) in the airways, so the cilia cant do their job of clearing mucus, dirt, and germs from the lungs. Its never too late to quit smoking. Your health will start to improve on the same day you stub out your last cigarette.    You dont have to quit alone  You may be more likely to quit for good if you seek support from others.  · Talk with your healthcare provider about your plans to quit. Ask about medicines that can help. Some contain nicotine and some do not. Some are available by prescription. You can buy others over-the-counter. These medicines help control the desire to use tobacco and the  uncomfortable symptoms people have when they try to quit. Others gradually lessen the level of nicotine in the body. Your healthcare provider can tell you about all the choices available including:  ¨ Oral medicines such as bupropion or varenicline  ¨ Nicotine replacement therapy such as gum, lozenge, a patch, inhaler, or nasal spray  · Join a support group or get advice from an ex-smoker.  · Ask other smokers in your household to quit with you.   Tips for quitting smoking  There isnt one right way to stop smoking. Everyone quits in his or her own way. Some of these tips may help:  · Make a list of reasons you want to quit. Keep this list and read it often.  · Pick a date to quit smoking. Then stick to it.  · List the things that make you want to smoke. Think of ways to avoid these triggers.  · Set goals for yourself, such as going for a week without smoking. Reward yourself when you meet your goals.  · If you dont quit the first time, keep trying! Many people have to try more than once before they stop smoking for good.     For more information  · smokefree.gov/gppv-ps-pm-expert  · [employer] [place] Smoking Quitline: 877-44U-QUIT (080-201-0113)   Date Last Reviewed: 5/1/2016  © 2093-6848 [employer]. [address] [place] [address], [place] [address]. All rights reserved. This information is not intended as a substitute for professional medical care. Always follow your healthcare professional's instructions.

## 2017-03-21 LAB
ANISOCYTOSIS BLD QL SMEAR: SLIGHT
BASOPHILS # BLD AUTO: 0.02 K/UL
BASOPHILS NFR BLD: 0.2 %
BURR CELLS BLD QL SMEAR: ABNORMAL
DIFFERENTIAL METHOD: ABNORMAL
EOSINOPHIL # BLD AUTO: 0.2 K/UL
EOSINOPHIL NFR BLD: 2.1 %
ERYTHROCYTE [DISTWIDTH] IN BLOOD BY AUTOMATED COUNT: 13.5 %
HCT VFR BLD AUTO: 46.2 %
HGB BLD-MCNC: 14.9 G/DL
LYMPHOCYTES # BLD AUTO: 1.8 K/UL
LYMPHOCYTES NFR BLD: 18 %
MCH RBC QN AUTO: 30.8 PG
MCHC RBC AUTO-ENTMCNC: 32.3 %
MCV RBC AUTO: 96 FL
MONOCYTES # BLD AUTO: 0.6 K/UL
MONOCYTES NFR BLD: 5.5 %
NEUTROPHILS # BLD AUTO: 7.5 K/UL
NEUTROPHILS NFR BLD: 74.2 %
PLATELET # BLD AUTO: 266 K/UL
PLATELET BLD QL SMEAR: ABNORMAL
PMV BLD AUTO: 10.7 FL
POIKILOCYTOSIS BLD QL SMEAR: SLIGHT
POLYCHROMASIA BLD QL SMEAR: ABNORMAL
RBC # BLD AUTO: 4.83 M/UL
WBC # BLD AUTO: 10.17 K/UL

## 2017-03-21 NOTE — PROGRESS NOTES
Cholesterol is a good bit higher than last year.   The 10-year ASCVD risk score (Savannamarcin VERA Jr, et al., 2013) is: 8.5% this needs statin therapy. Please get back in with Luzmaria to discuss starting meds. Other labs of sugar levels, blood counts, and  kidney, liver functions, and thyroid functions within goal ranges. Please inform

## 2017-03-31 ENCOUNTER — OFFICE VISIT (OUTPATIENT)
Dept: INTERNAL MEDICINE | Facility: CLINIC | Age: 42
End: 2017-03-31
Payer: MEDICARE

## 2017-03-31 VITALS
DIASTOLIC BLOOD PRESSURE: 94 MMHG | SYSTOLIC BLOOD PRESSURE: 126 MMHG | HEIGHT: 72 IN | TEMPERATURE: 97 F | HEART RATE: 80 BPM | BODY MASS INDEX: 26.22 KG/M2 | WEIGHT: 193.56 LBS

## 2017-03-31 DIAGNOSIS — E78.5 HYPERLIPIDEMIA, UNSPECIFIED HYPERLIPIDEMIA TYPE: Primary | ICD-10-CM

## 2017-03-31 PROCEDURE — 99999 PR PBB SHADOW E&M-EST. PATIENT-LVL III: CPT | Mod: PBBFAC,,, | Performed by: PHYSICIAN ASSISTANT

## 2017-03-31 PROCEDURE — 99213 OFFICE O/P EST LOW 20 MIN: CPT | Mod: PBBFAC,PO | Performed by: PHYSICIAN ASSISTANT

## 2017-03-31 PROCEDURE — 99213 OFFICE O/P EST LOW 20 MIN: CPT | Mod: S$PBB,,, | Performed by: PHYSICIAN ASSISTANT

## 2017-03-31 RX ORDER — ATORVASTATIN CALCIUM 40 MG/1
40 TABLET, FILM COATED ORAL DAILY
Qty: 90 TABLET | Refills: 3 | Status: SHIPPED | OUTPATIENT
Start: 2017-03-31 | End: 2017-08-17 | Stop reason: SDUPTHER

## 2017-03-31 RX ORDER — MELOXICAM 15 MG/1
TABLET ORAL
Refills: 1 | COMMUNITY
Start: 2017-03-23

## 2017-03-31 NOTE — PATIENT INSTRUCTIONS
High Cholesterol: Assessing Your Risk    Have you been told that your cholesterol is too high? If so, you could be heading for a heart attack, also known as acute myocardial infarction (AMI), or stroke. This is especially true if you have other risk factors for heart disease. Get smart about cholesterol and your heart disease risk. This sheet can help you understand your heart disease risk and how your cholesterol level affects it. Talk to your healthcare provider about how to get started controlling your cholesterol.  Why is high cholesterol a problem?  Blood cholesterol is a fatty substance. The body uses it to make membranes in cells and for hormone production. It travels through the bloodstream and is used by the tissues for normal function. When blood cholesterol is high, it forms plaque and causes inflammation. The plaque builds up in the walls of arteries (blood vessels that carry blood from the heart to the body). This narrows the opening for blood flow. Over time, the heart may not get enough oxygen. This can lead to coronary artery disease, heart attack, or stroke.  3 steps to assessing your risk  Step 1. Find your risk factors for heart disease and stroke  How your cholesterol numbers affect your heart health depends on other risk factors for heart attack and stroke. Check off each risk factor below that applies to you:  · Are you a man 45 years old or older or a woman 55 years old or older?  · Does your family have a history of heart problems before the age of 55 in male relatives or age 65 in female relatives? This includes heart attack, coronary heart disease, or atherosclerosis.  · Do you have high blood pressure? Do you take medicine to treat high blood pressure?  · Do you smoke?  · Do you have diabetes?  · Do you exercise very little or not very often? Recommendations are for 30 minutes of exercise at least 5 days a week. If you are not doing cardiovascular exercise as often as these  recommendations, it may not be enough and you may be at higher risk for elevated cholesterol and heart disease.  · Do you eat a diet that is high in saturated or trans fats, cholesterol, sugar, or alcohol? You may be at increased risk for heart disease if you do not eat enough fruits, vegetables, lean meats and eat sugars or drink alcohol sparingly.  · Have you been told you have high cholesterol? Do you take medicine to control your cholesterol?  Step 2. Test your cholesterol  Have your cholesterol tested every 5 years after the age of 20 and more often if you have risk factors. Cholesterol testing most often needs no preparation. Sometimes you may be asked to fast (not eat) before your test. A blood sample is taken and sent to a lab. There, the amount of cholesterol and triglyceride in your blood is measured. There are 2 types of cholesterol in the sample. The first is HDL (good cholesterol). The second is LDL (bad cholesterol). Cholesterol test results are most often shown as the total of HDL and LDL cholesterol numbers. You may also be told the separate HDL and LDL cholesterol results.  Fill in your numbers below.  HDL cholesterol:                           LDL cholesterol:                         Total cholesterol:                         Triglyceride:                           Step 3. Discuss the results with your healthcare provider   If your cholesterol levels are higher than normal, your healthcare provider will help you with steps to take to lower your levels. Steps may include lifestyle changes like diet, physical activity, and quitting smoking, and medicine to lower bad cholesterol levels.  If you have high cholesterol, you may need your cholesterol level tested more often to make sure your medicine and lifestyle changes are working to reduce your risks of having a heart attack or stroke.   Date Last Reviewed: 6/1/2016  © 4034-3807 [employer]. [address] [place] [address], [place] [address].  All rights reserved. This information is not intended as a substitute for professional medical care. Always follow your healthcare professional's instructions.        Understanding Your Cholesterol Numbers  The higher your blood cholesterol, the greater your risk for heart attack (acute myocardial infarction), cardiovascular disease, or stroke. High cholesterol can cause any artery in your body to become narrow. Thats why you need to know your cholesterol level. If its high, you can take steps to bring it down. Eating the right foods and getting enough exercise can help. Some people also need medicine to control their cholesterol. Your healthcare provider can help you get started on a plan to control your cholesterol.        Blood flows easily when arteries are clear.      Less blood flows when cholesterol builds up in artery walls.   Checking your cholesterol  Your cholesterol is checked with a simple blood test. The results tell you how much cholesterol you have in your blood. Get checked as often as your healthcare provider suggests. If you have diabetes or high cholesterol, you may need your blood tested as often as every 3 months. As you work to lower your cholesterol, your numbers will change slowly. But they will change. Be patient and stay on track. Discuss your numbers with your healthcare provider.   Your total cholesterol number  A blood test will give you a number for the total amount of cholesterol in your blood. The higher this number, the more likely it is that cholesterol will build up in your blood vessels. Even if your cholesterol is just slightly high, you are at increased risk for health problems.  My total cholesterol is: ________________  Your lipid numbers  Total cholesterol is just one part of the story. Cholesterol is made up of different kinds of fats (lipids). If your total cholesterol is high, knowing your lipid profile is important. The 2 most important lipids are HDL and LDL. Lipids  are checked after you have fasted. This means you dont eat for a certain amount of time before the test is done. Along with cholesterol, triglyceride can also lead to blocked arteries. Triglycerides are another type of fat. Knowing your HDL, LDL, and triglyceride numbers, as well as your total cholesterol gives you a more complete picture of your cholesterol level:  · HDL is called the good cholesterol. It moves out of the bloodstream and does not block your blood vessels. HDL levels are affected by how much you exercise and what you eat.My HDL cholesterol is: ________________  · LDL is called the bad cholesterol. This is because it can stick to your artery walls and block blood flow. LDL levels are most affected by what you eat and by your genes.My LDL cholesterol is: ________________  · Triglyceride is a type of fat the body uses to store energy. Too much triglyceride can increase your risk for heart disease. Triglyceride levels should be under 150.My triglyceride is:  ________________  Based on your other health issues and risk factors, your healthcare provider may have specific goals for treating your cholesterol. You may need to use different kinds of medicines that work on the different types of cholesterol. Work with your provider to know what your goals of treatment are. Stick with your treatment plan to reach the goals you set.  High-risk groups  Some people may need to take medicines called statins to control their cholesterol. This is in addition to eating a healthy diet and getting regular exercise.  Statins can help you stay healthy. They can also help prevent a heart attack or stroke. You may need to take a statin if you are in one of these groups:  · Adults who have had a heart attack or stroke. Or adults who have had peripheral vascular disease, a ministroke (transient ischemic attack), or stable or unstable angina. This group also includes people who have had a procedure to restore blood flow  through a blocked artery. These procedures include percutaneous coronary intervention, angioplasty, stent, and open-heart bypass surgery.  · Adults who have diabetes. Or adults who are at higher risk of having a heart attack or stroke and have an LDL cholesterol level of 70 to 189 mg/dL  · Adults who are 21 years old or older and have an LDL cholesterol level of 190 mg/dL or higher.  If you are in a high-risk group, talk with your healthcare provider about your treatment goals. Make sure you understand why these goals are important, based on your own health history and your family history of heart disease or high cholesterol.  Make a plan to have regular cholesterol checks. You may need to fast before getting this test. Also ask your provider about any side effects your medicines may cause. Let your provider know about any side effects you have. You may need to take more than one medicine to reach the cholesterol goals that you and your provider decide on.  Date Last Reviewed: 10/1/2016  © 0960-8717 [employer]. [address] [place] [address], [place] [address]. All rights reserved. This information is not intended as a substitute for professional medical care. Always follow your healthcare professional's instructions.        Lifestyle Changes to Control Cholesterol  You can control your cholesterol through diet, exercise, weight management, quitting smoking, stress management, and taking your medicines right. These things can also lower your risk for cardiovascular disease.    Eating healthy  Your healthcare provider will give you information on diet changes you may need to make. Your provider may recommend that you see a registered dietitian for help with diet changes. Changes may include:  · Cutting back on the amount of fat and cholesterol in your meals  · Eating less salt (sodium). This is especially important if you have high blood pressure.  · Eating more fresh vegetables and fruits  · Eating lean  proteins such as fish, poultry, beans, and peas  · Eating less red meat and processed meats  · Using low-fat dairy products  · Using vegetable and nut oils in limited amounts  · Limiting how many sweets and processed foods like chips, cookies, and baked goods that you eat   · Limiting how many sugar-sweetened beverages you drink  · Limiting how often you eat out  Getting exercise  Regular exercise is a good way to help your body control cholesterol. Regular exercise can help in many ways. It can:  · Raise your good cholesterol  · Help lower your bad cholesterol  · Let blood flow better through your body  · Give more oxygen to your muscles and tissues  · Help you manage your weight  · Help your heart pump better  · Lower your blood pressure  Your healthcare provider may recommend that you get more physical activity if you haven't been active. Your provider may recommend that you get moderate to vigorous physical activity for at least 40 minutes each day. You should do this for at least 3 to 4 days each week. A few examples of moderate to vigorous activity are:  · Walking at a brisk pace. This is about 3 to 4 miles per hour.  · Jogging or running  · Swimming or water aerobics  · Hiking  · Dancing  · Martial arts  · Tennis  · Riding a bicycle or stationary bike  · Dancing  Managing your weight  If you are overweight or obese, your healthcare provider will work with you to help you lose weight and lower your BMI (body mass index). Making diet changes and getting more physical activity can help. Changing your diet will help you lose weight more easily than adding exercise.  Quitting smoking  Smoking and other tobacco use can raise cholesterol and make it harder to control. Quitting is tough. But millions of people have given up tobacco for good. You can quit, too! Think about some of the reasons below to quit smoking. Do any of them make you think twice about your smoking habit?  Stop smoking because it:  · Keeps your  cholesterol high, even if you make all the other changes youre supposed to  · Damages your body. It especially harms your heart, lungs, skin, and blood vessels.  · Makes you more likely to have a heart attack (acute myocardial infarction), stroke, or cancer  · Stains your teeth  · Makes your skin, clothes, and breath smell bad  · Costs a lot of money  Controlling stress   Learn ways to control stress. This will help you deal with stress in your home and work life. Controlling stress can greatly lower your risk of getting cardiovascular disease.  Making the most of medicines  Healthy eating and exercise are a good start to keeping your cholesterol down. But you may need some extra help from medicine. If your doctor prescribes medicine, be sure to take it exactly as directed. Remember:  · Tell your healthcare provider about all other medicines you take. This includes vitamins and herbs.  · Tell your healthcare provider if you have any side effects after starting to take a medicine. Examples of side effects to watch for include muscle aches, weakness, blurred vision, rust-colored urine, yellowing of eyes or skin (jaundice), and headache.  · Dont skip a dose or stop taking your medicine because you feel better or because your cholesterol numbers go down. Never stop taking your medicine unless your healthcare provider has told you its OK.  · Ask your healthcare provider if you have any questions about your medicines.  High risk groups  Some people may need to take medicines called statins to control their cholesterol. This is in addition to eating a healthy diet and getting regular exercise.  Statins can help you stay healthy. They can also help prevent a heart attack or stroke. You may need to take a statin if you are in one of these groups:  · Adults who have had a heart attack or stroke. Or adults who have had peripheral vascular disease, a ministroke (transient ischemic attack), or stable or unstable angina. This  group also includes people who have had a procedure to restore blood flow through a blocked artery. These procedures include percutaneous coronary intervention, angioplasty, stent, and open-heart bypass surgery.  · Adults who have diabetes. Or adults who are at higher risk of having a heart attack or stroke and have an LDL cholesterol level of 70 to 189 mg/dL  · Adults who are 21 years old or older and have an LDL cholesterol level of 190 mg/dL or higher.  If you are in a high-risk group, talk with your healthcare provider about your treatment goals. Make sure you understand why these goals are important, based on your own health history and your family history of heart disease or high cholesterol.  Make a plan to have regular cholesterol checks. You may need to fast before getting this test. Also ask your provider about any side effects your medicines may cause. Let your provider know about any side effects you have. You may need to take more than one medicine to reach the cholesterol goals that you and your provider decide on.  Date Last Reviewed: 10/1/2016  © 7348-0403 [employer]. [address] [place] [address]. All rights reserved. This information is not intended as a substitute for professional medical care. Always follow your healthcare professional's instructions.        Aerobic Exercise for a Healthy Heart  Exercise is a lot more than an energy booster and a stress reliever. It also strengthens your heart muscle, lowers your blood pressure and cholesterol, and burns calories. It can also improve your resting muscle tone, and your mood.     Remember, some activity is better than none.    Choose an aerobic activity  Choose an activity that makes your heart and lungs work harder than they do when you rest or walk normally. This aerobic exercise can improve the way your heart and other muscles use oxygen. Make it fun by exercising with a friend and choosing an activity you enjoy. Here  are some ideas:  · Walking  · Swimming  · Bicycling  · Stair climbing  · Dancing  · Jogging  · Gardening  Exercise regularly  If you havent been exercising regularly,  get your doctors OK first. Then start slowly.  Here are some tips:  · Begin exercising 3 times a week for 5 to 10 minutes at a time.  · When you feel comfortable, add a few minutes each session.  · Slowly build up to exercising 3 to 4 times each week. Each session should last for 40 minutes, on average, and involve moderate- to vigorous-intensity physical activity.  · If you have been given nitroglycerin, be sure to carry it when you exercise.  · If you get chest pain (angina) when youre exercising, stop what youre doing, take your nitroglycerin, and call your doctor.  Date Last Reviewed: 6/2/2016 © 2000-2016 [employer]. [address] [place] [address]. All rights reserved. This information is not intended as a substitute for professional medical care. Always follow your healthcare professional's instructions.        Risk Factors for Heart Disease  A risk factor is something that increases your chance of having heart disease. Heart disease (also called coronary artery disease) involves damage to the heart arteries. These blood vessels need to work well to provide the oxygen your heart needs to pump blood to the rest of your body. Things like smoking or high cholesterol levels can damage arteries. You cant control some risk factors, such as age and a family history of heart disease. But there are many things you can control to reduce your risk for heart disease.    Unhealthy cholesterol levels  Cholesterol is a fat-like substance in your blood. It can build up along the artery walls. This is called plaque. Over time, plaque narrows the arteries and reduces blood flow to your heart or brain. If a blood clot forms or a piece of the plaque breaks off, it can completely block the artery and cause a heart attack or stroke.  "Your risk of heart disease goes up if you have high levels of LDL ("bad") cholesterol or triglycerides (another fatty substance that can build up). Youre also at risk if you have low HDL cholesterol ("good") cholesterol. HDL helps clear the bad cholesterol away. You're at risk if you have:  HDL cholesterol 50 mg/dL or lower; LDL cholesterol 100 mg/dL; or triglycerides of 150 mg/dL or higher.  Smoking  This is the most important risk factor you can change. Smoking causes inflammation and damages the smooth muscle that lines the arteries making them less flexible. It also raises your blood pressure, causing further damage to the artery lining. Smoking also increases your risk that your blood may clot, block an artery, and cause a heart attack or stroke. Smoking also damages your lungs, which affects the delivery of oxygen to the body. If you smoke, you are 2 to 4 times more likely to develop coronary artery disease. If you smoke, it's never too late to help your heart. Ask your doctor about nicotine replacement products and smoking cessation support.  High blood pressure  High blood pressure occurs when blood pushes too hard against artery walls. This causes damage to the artery walls and the formation of scar tissue as it heals. This makes the arteries stiff and weak. Plaque sticks to the scarred tissue narrowing and hardening the arteries. High blood pressure also causes your heart to work harder to get blood out to the body. High blood pressure raises your risk of heart attack, also known as acute myocardial infarction, or AMI, and especially stroke. The brain tissue is especially sensitive to high blood pressure damage. You're at risk if your blood pressure is 120/80 or higher.  Negative emotions  Chronic stress, pent-up anger, and other negative emotions have been linked to heart disease. This occurs because stress increases the levels of a hormone that increase the demand on your heart. Over time, these " emotions could raise your heart disease risk.  Metabolic syndrome  This is caused by a combination of certain risk factors. It puts you at extra high risk of heart disease, stroke, and diabetes. You have metabolic syndrome if you have 3 or more of the following: low HDL cholesterol; high triglycerides; high blood pressure; high blood sugar; extra weight around the waist.  Diabetes  Diabetes occurs when you have high levels of sugar (glucose) in your blood. This can damage arteries if not kept under control. Having diabetes also makes you more likely to have a silent heart attack--one without any symptoms.  Excess weight  Excess weight makes other risk factors, such as diabetes, more likely. Excess weight around the waist or stomach increases your heart disease risk the most.  Lack of physical activity  When youre not active, youre more likely to develop diabetes, high blood pressure, high cholesterol levels, and excess weight.     Most people with heart disease have more than one risk factor.   Date Last Reviewed: 3/28/2016  © 3058-6778 [email or web address] [address] [place] [address], [place] [address]. All rights reserved. This information is not intended as a substitute for professional medical care. Always follow your healthcare professional's instructions.

## 2017-03-31 NOTE — PROGRESS NOTES
Subjective:      Patient ID: Moris Palmer Jr. is a 41 y.o. male.    Chief Complaint: Hyperlipidemia    Hyperlipidemia   This is a recurrent problem. This is a new diagnosis. The problem is uncontrolled. Recent lipid tests were reviewed and are high. He has no history of chronic renal disease, diabetes, hypothyroidism, liver disease, obesity or nephrotic syndrome. Factors aggravating his hyperlipidemia include smoking. Pertinent negatives include no chest pain, focal sensory loss, leg pain, myalgias or shortness of breath. He is currently on no antihyperlipidemic treatment. Compliance problems include adherence to diet.  Risk factors for coronary artery disease include male sex, dyslipidemia and family history.     Here to discuss results  Drinks three 20 oz mountain dew's a day. Eats a lot of sugar (cookies). Only exercise he gets is with the physical activity he does with his job (walking, lifting boxes).     Review of Systems   Constitutional: Negative for activity change, appetite change, chills, diaphoresis, fatigue, fever and unexpected weight change.   HENT: Negative.  Negative for congestion, hearing loss, postnasal drip, rhinorrhea, sore throat, trouble swallowing and voice change.    Eyes: Negative.  Negative for visual disturbance.   Respiratory: Negative.  Negative for cough, choking, chest tightness and shortness of breath.    Cardiovascular: Negative for chest pain, palpitations and leg swelling.   Gastrointestinal: Negative for abdominal distention, abdominal pain, blood in stool, constipation, diarrhea, nausea and vomiting.   Endocrine: Negative for cold intolerance, heat intolerance, polydipsia and polyuria.   Genitourinary: Negative.  Negative for difficulty urinating and frequency.   Musculoskeletal: Negative for arthralgias, back pain, gait problem, joint swelling and myalgias.   Skin: Negative for color change, pallor, rash and wound.   Neurological: Negative for dizziness, tremors,  weakness, light-headedness, numbness and headaches.   Hematological: Negative for adenopathy.   Psychiatric/Behavioral: Negative for behavioral problems, confusion, self-injury, sleep disturbance and suicidal ideas. The patient is not nervous/anxious.      Objective:   BP (!) 126/94 (BP Location: Left arm, Patient Position: Sitting, BP Method: Manual)  Pulse 80  Temp 97.1 °F (36.2 °C) (Tympanic)   Ht 6' (1.829 m)  Wt 87.8 kg (193 lb 9 oz)  BMI 26.25 kg/m2    Physical Exam   Constitutional: He appears well-developed and well-nourished. No distress.   Cardiovascular: Normal rate, regular rhythm and normal heart sounds.  Exam reveals no gallop and no friction rub.    No murmur heard.  Pulmonary/Chest: Effort normal and breath sounds normal. No respiratory distress. He has no wheezes. He has no rales.   Skin: He is not diaphoretic.   Psychiatric: He has a normal mood and affect. His behavior is normal. Judgment and thought content normal.   Nursing note and vitals reviewed.      Assessment:     1. Hyperlipidemia, unspecified hyperlipidemia type      Plan:   Hyperlipidemia, unspecified hyperlipidemia type  -     atorvastatin (LIPITOR) 40 MG tablet; Take 1 tablet (40 mg total) by mouth once daily.  Dispense: 90 tablet; Refill: 3    -Diagnosis and treatment options discussed in detail with the patient today. Discussed risks of elevated cholesterol. Advised him to quit smoking as that elevates his risk of a cardiac event.   -exercise 30 minutes a day. Eliminate sugar from diet. Wheen off mountain dew. Follow up as scheduled.   -Handouts on elevated cholesterol and how to manage was sent home with him today.    Return if symptoms worsen or fail to improve.

## 2017-03-31 NOTE — MR AVS SNAPSHOT
[facility]  [address] [facility]  [place] [address]  Phone: [phone]  Fax: [phone]                  Moris Palmer Jr.   3/31/2017 8:20 AM   Office Visit    Description:  Male : 1975   Provider:  Isabella Bolton PA-C   [facility]           Reason for Visit     Hyperlipidemia           Diagnoses this Visit        Comments    Hyperlipidemia, unspecified hyperlipidemia type    -  Primary            To Do List           Future Appointments        Provider Department Dept Phone    2017 7:40 AM Sascha Tilley MD [facility] [phone]      Goals (5 Years of Data)     None      Follow-Up and Disposition     Return if symptoms worsen or fail to improve.    Follow-up and Disposition History       These Medications        Disp Refills Start End    atorvastatin (LIPITOR) 40 MG tablet 90 tablet 3 3/31/2017 2017    Take 1 tablet (40 mg total) by mouth once daily. - Oral    Pharmacy: [facility] [address] [place] [id number] [facility] Ph #: [phone]         [facility]     [facility] -  Assistance  Unless otherwise directed by your provider, please contact [facility], our nurse care line that is available for  assistance.     Registered nurses in the [facility] provide: appointment scheduling, clinical advisement, health education, and other advisory services.  Call: [phone] (toll free)               Medications           Message regarding Medications     Verify the changes and/or additions to your medication regime listed below are the same as discussed with your clinician today.  If any of these changes or additions are incorrect, please notify your healthcare provider.        START taking these NEW medications        Refills    atorvastatin (LIPITOR) 40 MG tablet 3    Sig: Take 1  tablet (40 mg total) by mouth once daily.    Class: Normal    Route: Oral           Verify that the below list of medications is an accurate representation of the medications you are currently taking.  If none reported, the list may be blank. If incorrect, please contact your healthcare provider. Carry this list with you in case of emergency.           Current Medications     albuterol 90 mcg/actuation inhaler Inhale 2 puffs into the lungs every 6 (six) hours as needed for Wheezing.    amitriptyline (ELAVIL) 25 MG tablet Take 25 mg by mouth nightly as needed for Insomnia.    baclofen (LIORESAL) 20 MG tablet Take 20 mg by mouth 3 (three) times daily.    fluticasone-vilanterol (BREO) 200-25 mcg/dose DsDv diskus inhaler Inhale 1 puff into the lungs once daily.    gabapentin (NEURONTIN) 800 MG tablet Take 1 tablet by mouth 3 (three) times daily.    losartan (COZAAR) 25 MG tablet Take 1 tablet (25 mg total) by mouth once daily.    meloxicam (MOBIC) 15 MG tablet TK 1 T PO  D    oxycodone-acetaminophen (PERCOCET)  mg per tablet Take 1 tablet by mouth every 4 (four) hours as needed for Pain.    pantoprazole (PROTONIX) 40 MG tablet Take 1 tablet (40 mg total) by mouth once daily.    tramadol (ULTRAM) 50 mg tablet Take 50 mg by mouth every 6 (six) hours as needed for Pain.    atorvastatin (LIPITOR) 40 MG tablet Take 1 tablet (40 mg total) by mouth once daily.    buPROPion (WELLBUTRIN SR) 150 MG TBSR 12 hr tablet Take 1 tablet (150 mg total) by mouth 2 (two) times daily. Begin one daily x 3days, then one twice daily. Do not take 2nd dose after 6 pm.           Clinical Reference Information           Your Vitals Were     BP Pulse Temp Height Weight BMI    126/94 (BP Location: Left arm, Patient Position: Sitting, BP Method: Manual) 80 97.1 °F (36.2 °C) (Tympanic) 6' (1.829 m) 87.8 kg (193 lb 9 oz) 26.25 kg/m2      Blood Pressure          Most Recent Value    BP  (!)  126/94      Allergies as of 3/31/2017     Asa [Aspirin]       Immunizations Administered on Date of Encounter - 3/31/2017     None      [facility] Sign-Up     Activating your [facility] account is as easy as 1-2-3!     1) Visit [email or web address], select Sign Up Now, enter this activation code and your date of birth, then select Next.  Activation code not generated  Current Patient Portal Status: Account disabled      2) Create a username and password to use when you visit [facility] in the future and select a security question in case you lose your password and select Next.    3) Enter your e-mail address and click Sign Up!    Additional Information  If you have questions, please e-mail [email or web address] or call [phone] to talk to our [facility] staff. Remember, [facility] is NOT to be used for urgent needs. For medical emergencies, dial 911.         Instructions      High Cholesterol: Assessing Your Risk    Have you been told that your cholesterol is too high? If so, you could be heading for a heart attack, also known as acute myocardial infarction (AMI), or stroke. This is especially true if you have other risk factors for heart disease. Get smart about cholesterol and your heart disease risk. This sheet can help you understand your heart disease risk and how your cholesterol level affects it. Talk to your healthcare provider about how to get started controlling your cholesterol.  Why is high cholesterol a problem?  Blood cholesterol is a fatty substance. The body uses it to make membranes in cells and for hormone production. It travels through the bloodstream and is used by the tissues for normal function. When blood cholesterol is high, it forms plaque and causes inflammation. The plaque builds up in the walls of arteries (blood vessels that carry blood from the heart to the body). This narrows the opening for blood flow. Over time, the heart may not get enough oxygen. This can lead to coronary artery disease, heart attack, or stroke.  3 steps to assessing your  risk  Step 1. Find your risk factors for heart disease and stroke  How your cholesterol numbers affect your heart health depends on other risk factors for heart attack and stroke. Check off each risk factor below that applies to you:  · Are you a man 45 years old or older or a woman 55 years old or older?  · Does your family have a history of heart problems before the age of 55 in male relatives or age 65 in female relatives? This includes heart attack, coronary heart disease, or atherosclerosis.  · Do you have high blood pressure? Do you take medicine to treat high blood pressure?  · Do you smoke?  · Do you have diabetes?  · Do you exercise very little or not very often? Recommendations are for 30 minutes of exercise at least 5 days a week. If you are not doing cardiovascular exercise as often as these recommendations, it may not be enough and you may be at higher risk for elevated cholesterol and heart disease.  · Do you eat a diet that is high in saturated or trans fats, cholesterol, sugar, or alcohol? You may be at increased risk for heart disease if you do not eat enough fruits, vegetables, lean meats and eat sugars or drink alcohol sparingly.  · Have you been told you have high cholesterol? Do you take medicine to control your cholesterol?  Step 2. Test your cholesterol  Have your cholesterol tested every 5 years after the age of 20 and more often if you have risk factors. Cholesterol testing most often needs no preparation. Sometimes you may be asked to fast (not eat) before your test. A blood sample is taken and sent to a lab. There, the amount of cholesterol and triglyceride in your blood is measured. There are 2 types of cholesterol in the sample. The first is HDL (good cholesterol). The second is LDL (bad cholesterol). Cholesterol test results are most often shown as the total of HDL and LDL cholesterol numbers. You may also be told the separate HDL and LDL cholesterol results.  Fill in your numbers  below.  HDL cholesterol:                           LDL cholesterol:                         Total cholesterol:                         Triglyceride:                           Step 3. Discuss the results with your healthcare provider   If your cholesterol levels are higher than normal, your healthcare provider will help you with steps to take to lower your levels. Steps may include lifestyle changes like diet, physical activity, and quitting smoking, and medicine to lower bad cholesterol levels.  If you have high cholesterol, you may need your cholesterol level tested more often to make sure your medicine and lifestyle changes are working to reduce your risks of having a heart attack or stroke.   Date Last Reviewed: 6/1/2016 © 2000-2016 [employer]. [address] [place] [address]. All rights reserved. This information is not intended as a substitute for professional medical care. Always follow your healthcare professional's instructions.        Understanding Your Cholesterol Numbers  The higher your blood cholesterol, the greater your risk for heart attack (acute myocardial infarction), cardiovascular disease, or stroke. High cholesterol can cause any artery in your body to become narrow. Thats why you need to know your cholesterol level. If its high, you can take steps to bring it down. Eating the right foods and getting enough exercise can help. Some people also need medicine to control their cholesterol. Your healthcare provider can help you get started on a plan to control your cholesterol.        Blood flows easily when arteries are clear.      Less blood flows when cholesterol builds up in artery walls.   Checking your cholesterol  Your cholesterol is checked with a simple blood test. The results tell you how much cholesterol you have in your blood. Get checked as often as your healthcare provider suggests. If you have diabetes or high cholesterol, you may need your blood tested as  often as every 3 months. As you work to lower your cholesterol, your numbers will change slowly. But they will change. Be patient and stay on track. Discuss your numbers with your healthcare provider.   Your total cholesterol number  A blood test will give you a number for the total amount of cholesterol in your blood. The higher this number, the more likely it is that cholesterol will build up in your blood vessels. Even if your cholesterol is just slightly high, you are at increased risk for health problems.  My total cholesterol is: ________________  Your lipid numbers  Total cholesterol is just one part of the story. Cholesterol is made up of different kinds of fats (lipids). If your total cholesterol is high, knowing your lipid profile is important. The 2 most important lipids are HDL and LDL. Lipids are checked after you have fasted. This means you dont eat for a certain amount of time before the test is done. Along with cholesterol, triglyceride can also lead to blocked arteries. Triglycerides are another type of fat. Knowing your HDL, LDL, and triglyceride numbers, as well as your total cholesterol gives you a more complete picture of your cholesterol level:  · HDL is called the good cholesterol. It moves out of the bloodstream and does not block your blood vessels. HDL levels are affected by how much you exercise and what you eat.My HDL cholesterol is: ________________  · LDL is called the bad cholesterol. This is because it can stick to your artery walls and block blood flow. LDL levels are most affected by what you eat and by your genes.My LDL cholesterol is: ________________  · Triglyceride is a type of fat the body uses to store energy. Too much triglyceride can increase your risk for heart disease. Triglyceride levels should be under 150.My triglyceride is:  ________________  Based on your other health issues and risk factors, your healthcare provider may have specific goals for treating your  cholesterol. You may need to use different kinds of medicines that work on the different types of cholesterol. Work with your provider to know what your goals of treatment are. Stick with your treatment plan to reach the goals you set.  High-risk groups  Some people may need to take medicines called statins to control their cholesterol. This is in addition to eating a healthy diet and getting regular exercise.  Statins can help you stay healthy. They can also help prevent a heart attack or stroke. You may need to take a statin if you are in one of these groups:  · Adults who have had a heart attack or stroke. Or adults who have had peripheral vascular disease, a ministroke (transient ischemic attack), or stable or unstable angina. This group also includes people who have had a procedure to restore blood flow through a blocked artery. These procedures include percutaneous coronary intervention, angioplasty, stent, and open-heart bypass surgery.  · Adults who have diabetes. Or adults who are at higher risk of having a heart attack or stroke and have an LDL cholesterol level of 70 to 189 mg/dL  · Adults who are 21 years old or older and have an LDL cholesterol level of 190 mg/dL or higher.  If you are in a high-risk group, talk with your healthcare provider about your treatment goals. Make sure you understand why these goals are important, based on your own health history and your family history of heart disease or high cholesterol.  Make a plan to have regular cholesterol checks. You may need to fast before getting this test. Also ask your provider about any side effects your medicines may cause. Let your provider know about any side effects you have. You may need to take more than one medicine to reach the cholesterol goals that you and your provider decide on.  Date Last Reviewed: 10/1/2016  © 4331-0435 "[employer]. [address] [place] [address], [place] [address]. All rights reserved. This information is not  intended as a substitute for professional medical care. Always follow your healthcare professional's instructions.        Lifestyle Changes to Control Cholesterol  You can control your cholesterol through diet, exercise, weight management, quitting smoking, stress management, and taking your medicines right. These things can also lower your risk for cardiovascular disease.    Eating healthy  Your healthcare provider will give you information on diet changes you may need to make. Your provider may recommend that you see a registered dietitian for help with diet changes. Changes may include:  · Cutting back on the amount of fat and cholesterol in your meals  · Eating less salt (sodium). This is especially important if you have high blood pressure.  · Eating more fresh vegetables and fruits  · Eating lean proteins such as fish, poultry, beans, and peas  · Eating less red meat and processed meats  · Using low-fat dairy products  · Using vegetable and nut oils in limited amounts  · Limiting how many sweets and processed foods like chips, cookies, and baked goods that you eat   · Limiting how many sugar-sweetened beverages you drink  · Limiting how often you eat out  Getting exercise  Regular exercise is a good way to help your body control cholesterol. Regular exercise can help in many ways. It can:  · Raise your good cholesterol  · Help lower your bad cholesterol  · Let blood flow better through your body  · Give more oxygen to your muscles and tissues  · Help you manage your weight  · Help your heart pump better  · Lower your blood pressure  Your healthcare provider may recommend that you get more physical activity if you haven't been active. Your provider may recommend that you get moderate to vigorous physical activity for at least 40 minutes each day. You should do this for at least 3 to 4 days each week. A few examples of moderate to vigorous activity are:  · Walking at a brisk pace. This is about 3 to 4 miles per  hour.  · Jogging or running  · Swimming or water aerobics  · Hiking  · Dancing  · Martial arts  · Tennis  · Riding a bicycle or stationary bike  · Dancing  Managing your weight  If you are overweight or obese, your healthcare provider will work with you to help you lose weight and lower your BMI (body mass index). Making diet changes and getting more physical activity can help. Changing your diet will help you lose weight more easily than adding exercise.  Quitting smoking  Smoking and other tobacco use can raise cholesterol and make it harder to control. Quitting is tough. But millions of people have given up tobacco for good. You can quit, too! Think about some of the reasons below to quit smoking. Do any of them make you think twice about your smoking habit?  Stop smoking because it:  · Keeps your cholesterol high, even if you make all the other changes youre supposed to  · Damages your body. It especially harms your heart, lungs, skin, and blood vessels.  · Makes you more likely to have a heart attack (acute myocardial infarction), stroke, or cancer  · Stains your teeth  · Makes your skin, clothes, and breath smell bad  · Costs a lot of money  Controlling stress   Learn ways to control stress. This will help you deal with stress in your home and work life. Controlling stress can greatly lower your risk of getting cardiovascular disease.  Making the most of medicines  Healthy eating and exercise are a good start to keeping your cholesterol down. But you may need some extra help from medicine. If your doctor prescribes medicine, be sure to take it exactly as directed. Remember:  · Tell your healthcare provider about all other medicines you take. This includes vitamins and herbs.  · Tell your healthcare provider if you have any side effects after starting to take a medicine. Examples of side effects to watch for include muscle aches, weakness, blurred vision, rust-colored urine, yellowing of eyes or skin  (jaundice), and headache.  · Dont skip a dose or stop taking your medicine because you feel better or because your cholesterol numbers go down. Never stop taking your medicine unless your healthcare provider has told you its OK.  · Ask your healthcare provider if you have any questions about your medicines.  High risk groups  Some people may need to take medicines called statins to control their cholesterol. This is in addition to eating a healthy diet and getting regular exercise.  Statins can help you stay healthy. They can also help prevent a heart attack or stroke. You may need to take a statin if you are in one of these groups:  · Adults who have had a heart attack or stroke. Or adults who have had peripheral vascular disease, a ministroke (transient ischemic attack), or stable or unstable angina. This group also includes people who have had a procedure to restore blood flow through a blocked artery. These procedures include percutaneous coronary intervention, angioplasty, stent, and open-heart bypass surgery.  · Adults who have diabetes. Or adults who are at higher risk of having a heart attack or stroke and have an LDL cholesterol level of 70 to 189 mg/dL  · Adults who are 21 years old or older and have an LDL cholesterol level of 190 mg/dL or higher.  If you are in a high-risk group, talk with your healthcare provider about your treatment goals. Make sure you understand why these goals are important, based on your own health history and your family history of heart disease or high cholesterol.  Make a plan to have regular cholesterol checks. You may need to fast before getting this test. Also ask your provider about any side effects your medicines may cause. Let your provider know about any side effects you have. You may need to take more than one medicine to reach the cholesterol goals that you and your provider decide on.  Date Last Reviewed: 10/1/2016  © 3586-8549 [employer] [address] [place] [address]  [place] [address]. All rights reserved. This information is not intended as a substitute for professional medical care. Always follow your healthcare professional's instructions.        Aerobic Exercise for a Healthy Heart  Exercise is a lot more than an energy booster and a stress reliever. It also strengthens your heart muscle, lowers your blood pressure and cholesterol, and burns calories. It can also improve your resting muscle tone, and your mood.     Remember, some activity is better than none.    Choose an aerobic activity  Choose an activity that makes your heart and lungs work harder than they do when you rest or walk normally. This aerobic exercise can improve the way your heart and other muscles use oxygen. Make it fun by exercising with a friend and choosing an activity you enjoy. Here are some ideas:  · Walking  · Swimming  · Bicycling  · Stair climbing  · Dancing  · Jogging  · Gardening  Exercise regularly  If you havent been exercising regularly,  get your doctors OK first. Then start slowly.  Here are some tips:  · Begin exercising 3 times a week for 5 to 10 minutes at a time.  · When you feel comfortable, add a few minutes each session.  · Slowly build up to exercising 3 to 4 times each week. Each session should last for 40 minutes, on average, and involve moderate- to vigorous-intensity physical activity.  · If you have been given nitroglycerin, be sure to carry it when you exercise.  · If you get chest pain (angina) when youre exercising, stop what youre doing, take your nitroglycerin, and call your doctor.  Date Last Reviewed: 6/2/2016  © 1162-2290 [employer]. [address] [place] [address]. All rights reserved. This information is not intended as a substitute for professional medical care. Always follow your healthcare professional's instructions.        Risk Factors for Heart Disease  A risk factor is something that increases your chance of having heart  "disease. Heart disease (also called coronary artery disease) involves damage to the heart arteries. These blood vessels need to work well to provide the oxygen your heart needs to pump blood to the rest of your body. Things like smoking or high cholesterol levels can damage arteries. You cant control some risk factors, such as age and a family history of heart disease. But there are many things you can control to reduce your risk for heart disease.    Unhealthy cholesterol levels  Cholesterol is a fat-like substance in your blood. It can build up along the artery walls. This is called plaque. Over time, plaque narrows the arteries and reduces blood flow to your heart or brain. If a blood clot forms or a piece of the plaque breaks off, it can completely block the artery and cause a heart attack or stroke. Your risk of heart disease goes up if you have high levels of LDL ("bad") cholesterol or triglycerides (another fatty substance that can build up). Youre also at risk if you have low HDL cholesterol ("good") cholesterol. HDL helps clear the bad cholesterol away. You're at risk if you have:  HDL cholesterol 50 mg/dL or lower; LDL cholesterol 100 mg/dL; or triglycerides of 150 mg/dL or higher.  Smoking  This is the most important risk factor you can change. Smoking causes inflammation and damages the smooth muscle that lines the arteries making them less flexible. It also raises your blood pressure, causing further damage to the artery lining. Smoking also increases your risk that your blood may clot, block an artery, and cause a heart attack or stroke. Smoking also damages your lungs, which affects the delivery of oxygen to the body. If you smoke, you are 2 to 4 times more likely to develop coronary artery disease. If you smoke, it's never too late to help your heart. Ask your doctor about nicotine replacement products and smoking cessation support.  High blood pressure  High blood pressure occurs when blood pushes " too hard against artery walls. This causes damage to the artery walls and the formation of scar tissue as it heals. This makes the arteries stiff and weak. Plaque sticks to the scarred tissue narrowing and hardening the arteries. High blood pressure also causes your heart to work harder to get blood out to the body. High blood pressure raises your risk of heart attack, also known as acute myocardial infarction, or AMI, and especially stroke. The brain tissue is especially sensitive to high blood pressure damage. You're at risk if your blood pressure is 120/80 or higher.  Negative emotions  Chronic stress, pent-up anger, and other negative emotions have been linked to heart disease. This occurs because stress increases the levels of a hormone that increase the demand on your heart. Over time, these emotions could raise your heart disease risk.  Metabolic syndrome  This is caused by a combination of certain risk factors. It puts you at extra high risk of heart disease, stroke, and diabetes. You have metabolic syndrome if you have 3 or more of the following: low HDL cholesterol; high triglycerides; high blood pressure; high blood sugar; extra weight around the waist.  Diabetes  Diabetes occurs when you have high levels of sugar (glucose) in your blood. This can damage arteries if not kept under control. Having diabetes also makes you more likely to have a silent heart attack--one without any symptoms.  Excess weight  Excess weight makes other risk factors, such as diabetes, more likely. Excess weight around the waist or stomach increases your heart disease risk the most.  Lack of physical activity  When youre not active, youre more likely to develop diabetes, high blood pressure, high cholesterol levels, and excess weight.     Most people with heart disease have more than one risk factor.   Date Last Reviewed: 3/28/2016  © 9570-9507 [employer]. [address] [place] [address], [place] [address]. All rights  reserved. This information is not intended as a substitute for professional medical care. Always follow your healthcare professional's instructions.             Smoking Cessation     If you would like to quit smoking:   You may be eligible for free services if you are a [place] resident and started smoking cigarettes before September 1, 1988.  Call the Smoking Cessation Trust (SCT) toll free at (570) 477-9358 or (321) 505-7718.   Call 1-800-QUIT-NOW if you do not meet the above criteria.   Contact us via email: [email or web address]   View our website for more information: [email or web address]        Language Assistance Services     ATTENTION: Language assistance services are available, free of charge. Please call [phone].      ATENCIÓN: Si luke phillip, tiene a gusman disposición servicios gratuitos de asistencia lingüística. Llame al [phone].     YINKA Ý: N?u b?n nói Ti?ng Vi?t, có các d?ch v? h? tr? ngôn ng? mi?n phí dành cho b?n. G?i s? [phone].         [facility] complies with applicable Federal civil rights laws and does not discriminate on the basis of race, color, national origin, age, disability, or sex.

## 2017-05-08 ENCOUNTER — OFFICE VISIT (OUTPATIENT)
Dept: URGENT CARE | Facility: CLINIC | Age: 42
End: 2017-05-08
Payer: MEDICARE

## 2017-05-08 VITALS
OXYGEN SATURATION: 97 % | HEART RATE: 74 BPM | TEMPERATURE: 99 F | WEIGHT: 189.38 LBS | BODY MASS INDEX: 25.68 KG/M2 | SYSTOLIC BLOOD PRESSURE: 120 MMHG | DIASTOLIC BLOOD PRESSURE: 80 MMHG

## 2017-05-08 DIAGNOSIS — L02.413 ABSCESS OF RIGHT ARM: Primary | ICD-10-CM

## 2017-05-08 DIAGNOSIS — L81.0 POST-INFLAMMATORY HYPERPIGMENTATION: ICD-10-CM

## 2017-05-08 DIAGNOSIS — F41.9 ANXIETY: ICD-10-CM

## 2017-05-08 PROCEDURE — 99215 OFFICE O/P EST HI 40 MIN: CPT | Mod: PBBFAC,PO | Performed by: NURSE PRACTITIONER

## 2017-05-08 PROCEDURE — 87186 SC STD MICRODIL/AGAR DIL: CPT

## 2017-05-08 PROCEDURE — 87077 CULTURE AEROBIC IDENTIFY: CPT

## 2017-05-08 PROCEDURE — 99999 PR PBB SHADOW E&M-EST. PATIENT-LVL V: CPT | Mod: PBBFAC,,, | Performed by: NURSE PRACTITIONER

## 2017-05-08 PROCEDURE — 87070 CULTURE OTHR SPECIMN AEROBIC: CPT

## 2017-05-08 PROCEDURE — 99213 OFFICE O/P EST LOW 20 MIN: CPT | Mod: S$PBB,,, | Performed by: NURSE PRACTITIONER

## 2017-05-08 RX ORDER — MUPIROCIN 20 MG/G
OINTMENT TOPICAL 3 TIMES DAILY
Qty: 30 G | Refills: 0 | Status: SHIPPED | OUTPATIENT
Start: 2017-05-08 | End: 2017-05-18

## 2017-05-08 NOTE — MR AVS SNAPSHOT
[facility]  [address]  [place] [address]  Phone: [phone]  Fax: [phone]                  Moris Palmer Jr.   2017 4:20 PM   Office Visit    Description:  Male : 1975   Provider:  JUAREZ Hay   Department:  [facility]           Reason for Visit     bumps           Diagnoses this Visit        Comments    Abscess of right arm    -  Primary            To Do List           Future Appointments        Provider Department Dept Phone    2017 7:40 AM Sascha Tilley [facility] [phone]      Goals (5 Years of Data)     None       These Medications        Disp Refills Start End    mupirocin (BACTROBAN) 2 % ointment 30 g 0 2017    Apply topically 3 (three) times daily. - Topical (Top)    Pharmacy: [id number] [address] [place] [id number] [facility] Ph #: [phone]         [facility]     [facility] -  Assistance  Unless otherwise directed by your provider, please contact [facility], our nurse care line that is available for  assistance.     Registered nurses in the [facility] provide: appointment scheduling, clinical advisement, health education, and other advisory services.  Call: [phone] (toll free)               Medications           Message regarding Medications     Verify the changes and/or additions to your medication regime listed below are the same as discussed with your clinician today.  If any of these changes or additions are incorrect, please notify your healthcare provider.        START taking these NEW medications        Refills    mupirocin (BACTROBAN) 2 % ointment 0    Sig: Apply topically 3 (three) times daily.    Class: Normal    Route: Topical (Top)           Verify that the below list of medications is an accurate representation of the medications you are  currently taking.  If none reported, the list may be blank. If incorrect, please contact your healthcare provider. Carry this list with you in case of emergency.           Current Medications     albuterol 90 mcg/actuation inhaler Inhale 2 puffs into the lungs every 6 (six) hours as needed for Wheezing.    amitriptyline (ELAVIL) 25 MG tablet Take 25 mg by mouth nightly as needed for Insomnia.    atorvastatin (LIPITOR) 40 MG tablet Take 1 tablet (40 mg total) by mouth once daily.    baclofen (LIORESAL) 20 MG tablet Take 20 mg by mouth 3 (three) times daily.    buPROPion (WELLBUTRIN SR) 150 MG TBSR 12 hr tablet Take 1 tablet (150 mg total) by mouth 2 (two) times daily. Begin one daily x 3days, then one twice daily. Do not take 2nd dose after 6 pm.    fluticasone-vilanterol (BREO) 200-25 mcg/dose DsDv diskus inhaler Inhale 1 puff into the lungs once daily.    gabapentin (NEURONTIN) 800 MG tablet Take 1 tablet by mouth 3 (three) times daily.    losartan (COZAAR) 25 MG tablet Take 1 tablet (25 mg total) by mouth once daily.    meloxicam (MOBIC) 15 MG tablet TK 1 T PO  D    mupirocin (BACTROBAN) 2 % ointment Apply topically 3 (three) times daily.    oxycodone-acetaminophen (PERCOCET)  mg per tablet Take 1 tablet by mouth every 4 (four) hours as needed for Pain.    pantoprazole (PROTONIX) 40 MG tablet Take 1 tablet (40 mg total) by mouth once daily.    tramadol (ULTRAM) 50 mg tablet Take 50 mg by mouth every 6 (six) hours as needed for Pain.           Clinical Reference Information           Your Vitals Were     BP Pulse Temp Weight SpO2 BMI    120/80 (BP Location: Right arm, Patient Position: Sitting, BP Method: Manual) 74 98.5 °F (36.9 °C) (Tympanic) 85.9 kg (189 lb 6 oz) 97% 25.68 kg/m2      Blood Pressure          Most Recent Value    BP  120/80      Allergies as of 5/8/2017     Asa [Aspirin]      Immunizations Administered on Date of Encounter - 5/8/2017     None      [facility] Sign-Up     Activating your  [facility] account is as easy as 1-2-3!     1) Visit [email or web address], select Sign Up Now, enter this activation code and your date of birth, then select Next.  Activation code not generated  Current Patient Portal Status: Account disabled      2) Create a username and password to use when you visit [facility] in the future and select a security question in case you lose your password and select Next.    3) Enter your e-mail address and click Sign Up!    Additional Information  If you have questions, please e-mail [email or web address] or call [phone] to talk to our [facility] staff. Remember, [facility] is NOT to be used for urgent needs. For medical emergencies, dial 911.         Instructions    Keep wound clean and dry.  Apply warm compresses four times daily to the wound to help draw out infection.  Take antibiotic as prescribed for full course of treatment.  Bactroban ointment three times day.  Keep wound covered when going outside or working.    If symptoms worsen or fail to improve with treatment, fever occurs, or if wound increases in size or fails to respond to antibiotic, see your Primary Care Provider or go to the nearest Emergency Room.        Abscess (Antibiotic Treatment Only)  An abscess (sometimes called a boil) happens when bacteria get trapped under the skin and start to grow. Pus forms inside the abscess as the body responds to the bacteria. An abscess can happen with an insect bite, ingrown hair, blocked oil gland, pimple, cyst, or puncture wound.  In the early stages, your wound may be red and tender. For this stage, you may get antibiotics. If the abscess does not get better with antibiotics, it will need to be drained with a small cut.  Home care  These tips will help you care for your abscess at home:  · Soak the wound in hot water or apply hot packs (small towel soaked in hot water) to the area for 20 minutes at a time. Do this 3 to 4 times a day.  · Do not cut, squeeze, or pop the boil  yourself.  · Apply antibiotic cream or ointment to the skin 3 to 4 times a day, unless something else was prescribed. Some ointments include an antibiotic plus a pain reliever.  · If your doctor prescribed antibiotics, do not stop taking them until you have finished the medicine or the doctor tells you to stop.  · You may use an over-the-counter pain medicine to control pain, unless another pain medicine was prescribed. If you have chronic liver or kidney disease or ever had a stomach ulcer or gastrointestinal bleeding, talk with your doctor before using these any of these.  Follow-up care  Follow up with your healthcare provider, or as advised. Check your wound each day for the signs of worsening infection listed below.  When to seek medical advice  Get prompt medical attention if any of these occur:  · An increase in redness or swelling  · Red streaks in the skin leading away from the abscess  · An increase in local pain or swelling  · Fever of 100.4ºF (38ºC) or higher, or as directed by your healthcare provider  · Pus or fluid coming from the abscess  · Boil returns after getting better  Date Last Reviewed: 9/1/2016  © 8119-3515 [employer]. [address] [place] [address]. All rights reserved. This information is not intended as a substitute for professional medical care. Always follow your healthcare professional's instructions.             Smoking Cessation     If you would like to quit smoking:   You may be eligible for free services if you are a [place] resident and started smoking cigarettes before September 1, 1988.  Call the Smoking Cessation Trust ([facility]) toll free at (188) 347-6995 or (433) 195-8662.   Call 1-800-QUIT-NOW if you do not meet the above criteria.   Contact us via email: [email or web address]   View our website for more information: [email or web address]        Language Assistance Services     ATTENTION: Language assistance services are available, free of  charge. Please call [phone].      ATENCIÓN: Si habla español, tiene a gusman disposición servicios gratuitos de asistencia lingüística. Llame al [phone].     CHÚ Ý: N?u b?n nói Ti?ng Vi?t, có các d?ch v? h? tr? ngôn ng? mi?n phí dành cho b?n. G?i s? [phone].         [facility] complies with applicable Federal civil rights laws and does not discriminate on the basis of race, color, national origin, age, disability, or sex.

## 2017-05-08 NOTE — PROGRESS NOTES
"Subjective:       Patient ID: Moris Palmer Jr. is a 41 y.o. male.    Chief Complaint: bumps    HPI Comments: Patient states that he has had abscesses in the past and has had to be on antibiotics multiple times in the past. He saw Dr. Combs's PA last Friday for his back pain and showed him an abscess on the right arm. He was started on Clindamycin QID. He is on day 4 of clindamycin and feels that things are not improving. Patient denies fever. Patient admits that he has an underlying history of anxiety and is a "" and picks his wounds and makes them worse. He has been applying neosporin with no improvement.       /80 (BP Location: Right arm, Patient Position: Sitting, BP Method: Manual)  Pulse 74  Temp 98.5 °F (36.9 °C) (Tympanic)   Wt 85.9 kg (189 lb 6 oz)  SpO2 97%  BMI 25.68 kg/m2    Review of Systems   Constitutional: Negative for activity change, appetite change, chills, diaphoresis, fatigue, fever and unexpected weight change.   HENT: Negative.    Eyes: Negative.    Respiratory: Negative for apnea, chest tightness, shortness of breath and stridor.    Cardiovascular: Negative for chest pain, palpitations and leg swelling.   Gastrointestinal: Negative.    Endocrine: Negative.    Genitourinary: Negative.    Musculoskeletal: Negative for arthralgias and myalgias.   Skin: Positive for color change and wound. Negative for pallor and rash.   Allergic/Immunologic: Negative.    Neurological: Negative for dizziness, facial asymmetry, light-headedness and headaches.   Hematological: Negative for adenopathy.   Psychiatric/Behavioral: Negative for agitation and behavioral problems.       Objective:      Physical Exam   Constitutional: He is oriented to person, place, and time. He appears well-developed and well-nourished. No distress.   HENT:   Head: Normocephalic and atraumatic.   Cardiovascular: Normal rate, regular rhythm and normal heart sounds.    Pulmonary/Chest: Effort normal and breath " sounds normal. No respiratory distress.   Neurological: He is alert and oriented to person, place, and time.   Skin: Skin is warm and dry. No rash noted. He is not diaphoretic.   Abscess to right posterior upper arm. No fluctuance. There is an opened area with whitish tissue visible. Mild induration and surrounding erythema. There is scattered healing abscesses and post inflammatory hyperpigmentation to the upper arms and shoulders.    Psychiatric: He has a normal mood and affect. His behavior is normal. Judgment and thought content normal.   Nursing note and vitals reviewed.      Assessment:       1. Abscess of right arm    2. Anxiety    3. Post-inflammatory hyperpigmentation        Plan:       Moris was seen today for bumps.    Diagnoses and all orders for this visit:    Abscess of right arm  -     mupirocin (BACTROBAN) 2 % ointment; Apply topically 3 (three) times daily.  -     CULTURE, AEROBIC  (SPECIFY SOURCE)  -     Ambulatory referral to Dermatology  Will obtain culture today. Continue clindamycin while culture is in progress. Warm compresses, antibacterial soap. Derm referral due to ongoing picking and abscesses.     Anxiety  Post-inflammatory hyperpigmentation    it seems patient's anxiety is triggering him to pick simple scabs and lesions and cause them to become infected. Patient agrees and sees a trend that he picks his skin when he is anxious. He used to take meds for anxiety but is not on them anymore per Patient. Patient instructed to contact Primary Care Physician to discuss anxiety and picking lesions.

## 2017-05-08 NOTE — PATIENT INSTRUCTIONS
Keep wound clean and dry.  Apply warm compresses four times daily to the wound to help draw out infection.  Take antibiotic as prescribed for full course of treatment.  Bactroban ointment three times day.  Keep wound covered when going outside or working.    If symptoms worsen or fail to improve with treatment, fever occurs, or if wound increases in size or fails to respond to antibiotic, see your Primary Care Provider or go to the nearest Emergency Room.        Abscess (Antibiotic Treatment Only)  An abscess (sometimes called a boil) happens when bacteria get trapped under the skin and start to grow. Pus forms inside the abscess as the body responds to the bacteria. An abscess can happen with an insect bite, ingrown hair, blocked oil gland, pimple, cyst, or puncture wound.  In the early stages, your wound may be red and tender. For this stage, you may get antibiotics. If the abscess does not get better with antibiotics, it will need to be drained with a small cut.  Home care  These tips will help you care for your abscess at home:  · Soak the wound in hot water or apply hot packs (small towel soaked in hot water) to the area for 20 minutes at a time. Do this 3 to 4 times a day.  · Do not cut, squeeze, or pop the boil yourself.  · Apply antibiotic cream or ointment to the skin 3 to 4 times a day, unless something else was prescribed. Some ointments include an antibiotic plus a pain reliever.  · If your doctor prescribed antibiotics, do not stop taking them until you have finished the medicine or the doctor tells you to stop.  · You may use an over-the-counter pain medicine to control pain, unless another pain medicine was prescribed. If you have chronic liver or kidney disease or ever had a stomach ulcer or gastrointestinal bleeding, talk with your doctor before using these any of these.  Follow-up care  Follow up with your healthcare provider, or as advised. Check your wound each day for the signs of worsening  infection listed below.  When to seek medical advice  Get prompt medical attention if any of these occur:  · An increase in redness or swelling  · Red streaks in the skin leading away from the abscess  · An increase in local pain or swelling  · Fever of 100.4ºF (38ºC) or higher, or as directed by your healthcare provider  · Pus or fluid coming from the abscess  · Boil returns after getting better  Date Last Reviewed: 9/1/2016  © 6136-4705 [employer]. [address] [place] [address], [place] [address]. All rights reserved. This information is not intended as a substitute for professional medical care. Always follow your healthcare professional's instructions.

## 2017-05-11 LAB — BACTERIA SPEC AEROBE CULT: NORMAL

## 2017-07-31 ENCOUNTER — OFFICE VISIT (OUTPATIENT)
Dept: URGENT CARE | Facility: CLINIC | Age: 42
End: 2017-07-31
Payer: MEDICARE

## 2017-07-31 ENCOUNTER — TELEPHONE (OUTPATIENT)
Dept: URGENT CARE | Facility: CLINIC | Age: 42
End: 2017-07-31

## 2017-07-31 VITALS
SYSTOLIC BLOOD PRESSURE: 143 MMHG | WEIGHT: 190.06 LBS | BODY MASS INDEX: 25.74 KG/M2 | TEMPERATURE: 98 F | DIASTOLIC BLOOD PRESSURE: 90 MMHG | HEART RATE: 105 BPM | HEIGHT: 72 IN | OXYGEN SATURATION: 98 %

## 2017-07-31 DIAGNOSIS — J06.9 VIRAL URI WITH COUGH: Primary | ICD-10-CM

## 2017-07-31 DIAGNOSIS — Z72.0 TOBACCO ABUSE: ICD-10-CM

## 2017-07-31 DIAGNOSIS — R05.9 COUGH: ICD-10-CM

## 2017-07-31 DIAGNOSIS — J06.9 ACUTE URI: ICD-10-CM

## 2017-07-31 DIAGNOSIS — I10 ESSENTIAL HYPERTENSION: ICD-10-CM

## 2017-07-31 PROCEDURE — 99214 OFFICE O/P EST MOD 30 MIN: CPT | Mod: PBBFAC,PO | Performed by: NURSE PRACTITIONER

## 2017-07-31 PROCEDURE — 99999 PR PBB SHADOW E&M-EST. PATIENT-LVL IV: CPT | Mod: PBBFAC,,, | Performed by: NURSE PRACTITIONER

## 2017-07-31 PROCEDURE — 99214 OFFICE O/P EST MOD 30 MIN: CPT | Mod: S$PBB,,, | Performed by: NURSE PRACTITIONER

## 2017-07-31 RX ORDER — BENZONATATE 100 MG/1
100 CAPSULE ORAL 3 TIMES DAILY PRN
Qty: 30 CAPSULE | Refills: 0 | Status: SHIPPED | OUTPATIENT
Start: 2017-07-31 | End: 2017-08-09

## 2017-07-31 NOTE — PATIENT INSTRUCTIONS
Adult Self-Care for Colds  Colds are caused by viruses. They cant be cured with antibiotics. However, you can relieve symptoms and support your bodys efforts to heal itself. No matter which symptoms you have, be sure to drink plenty of fluids (water or clear soup); stop smoking and drinking alcohol; and get plenty of rest.   Understand a fever  · Take your temperature several times a day. If your fever is 100.4°F (38.0°C) for more than a day, call your doctor.  · Relax, lie down. Go to bed if you want. Just get off your feet and rest. Also, drink plenty of fluids to avoid dehydration.  · Take acetaminophen or a nonsteroidal anti-inflammatory agent (NSAID), such as ibuprofen.  Treat a troubled nose kindly  · Breathe steam or heated humidified air to open blocked nasal passages.  a hot shower or use a vaporizer. Be careful not to get burned by the steam.  · Saline nasal sprays and decongestant tablets help open a stuffy nose. Antihistamines can also help, but they can cause side effects such as drowsiness and drying of the eyes, nose, and mouth.  Soothe a sore throat and cough  · Gargle every 2 hours with 1/4 teaspoon of salt dissolved in 1/2 cup of warm water. Suck on throat lozenges and cough drops to moisten your throat.  · Cough medicines are available but it is unclear how effective they actually are.  · Take acetaminophen or an NSAID, such as ibuprofen to ease throat pain  Ease digestive problems  · Put fluid back into your body. Take frequent sips of clear liquids such as water or broth. Do not drink beverages with a lot of sugar in them, such as juices and sodas. These can make diarrhea worse. Older children and adults can drink sports drinks.  · As your appetite returns, you can resume your normal diet. Ask your doctor whether there are any foods you should avoid.     When to seek medical care  When you first notice symptoms, ask your health care provider if antiviral medications are  appropriate. Antibiotics should not be taken for colds or flu. Also, call your doctor if you have any of the following symptoms or if you arent feeling better after 7 days:  · Shortness of breath  · Pain or pressure in the chest or abdomen  · Worsening symptoms, especially after a period of improvement  · Fever of 100.4°F  (38.0°C) or higher, or fever that doesnt go down with medication  · Sudden dizziness or confusion  · Severe or continued vomiting  · Signs of dehydration, including extreme thirst, dark urine, infrequent urination, dry mouth  · Spotted, red, or very sore throat   Date Last Reviewed: 6/19/2014  © 8962-5103 [employer]. [address] [place] [address], [place] [address]. All rights reserved. This information is not intended as a substitute for professional medical care. Always follow your healthcare professional's instructions.

## 2017-07-31 NOTE — PROGRESS NOTES
Subjective:       Patient ID: Moris Palmer Jr. is a 41 y.o. male.    Chief Complaint: URI    URI    This is a new problem. Episode onset: 2-3 days. The problem has been gradually worsening. There has been no fever. Associated symptoms include congestion and coughing. Pertinent negatives include no abdominal pain, chest pain, diarrhea, dysuria, ear pain, headaches, joint pain, joint swelling, nausea, neck pain, plugged ear sensation, rash, rhinorrhea, sinus pain, sneezing, sore throat, swollen glands, vomiting or wheezing. He has tried nothing (breo daily, albuterol prn) for the symptoms. The treatment provided no relief.       BP (!) 143/90 (BP Location: Left arm, Patient Position: Sitting, BP Method: Manual)   Pulse 105   Temp 98.2 °F (36.8 °C) (Tympanic)   Ht 6' (1.829 m)   Wt 86.2 kg (190 lb 0.6 oz)   SpO2 98%   BMI 25.77 kg/m²     Review of Systems   Constitutional: Negative for activity change, appetite change, chills, diaphoresis, fatigue, fever and unexpected weight change.   HENT: Positive for congestion and postnasal drip. Negative for dental problem, drooling, ear discharge, ear pain, facial swelling, hearing loss, mouth sores, nosebleeds, rhinorrhea, sinus pressure, sneezing, sore throat, tinnitus, trouble swallowing and voice change.    Eyes: Negative for photophobia, pain, discharge, redness, itching and visual disturbance.   Respiratory: Positive for cough. Negative for apnea, choking, chest tightness, shortness of breath and wheezing.    Cardiovascular: Negative for chest pain, palpitations and leg swelling.   Gastrointestinal: Negative for abdominal distention, abdominal pain, anal bleeding, blood in stool, constipation, diarrhea, nausea, rectal pain and vomiting.   Endocrine: Negative.    Genitourinary: Negative for decreased urine volume, difficulty urinating, dysuria, hematuria and urgency.   Musculoskeletal: Negative for arthralgias, gait problem, joint pain, joint swelling,  myalgias, neck pain and neck stiffness.   Skin: Negative for color change, pallor, rash and wound.   Allergic/Immunologic: Negative for environmental allergies, food allergies and immunocompromised state.   Neurological: Negative for dizziness, tremors, seizures, syncope, facial asymmetry, speech difficulty, weakness, light-headedness, numbness and headaches.   Hematological: Negative for adenopathy. Does not bruise/bleed easily.   Psychiatric/Behavioral: Negative for agitation, behavioral problems, confusion, decreased concentration, dysphoric mood, hallucinations and sleep disturbance. The patient is not nervous/anxious and is not hyperactive.        Objective:      Physical Exam   Constitutional: He is oriented to person, place, and time. He appears well-developed and well-nourished. No distress.   HENT:   Head: Normocephalic and atraumatic.   Right Ear: Tympanic membrane, external ear and ear canal normal. No decreased hearing is noted.   Left Ear: Tympanic membrane, external ear and ear canal normal. No decreased hearing is noted.   Nose: Mucosal edema present. No rhinorrhea or sinus tenderness. No epistaxis. Right sinus exhibits no maxillary sinus tenderness and no frontal sinus tenderness. Left sinus exhibits no maxillary sinus tenderness and no frontal sinus tenderness.   Mouth/Throat: Uvula is midline, oropharynx is clear and moist and mucous membranes are normal. No oropharyngeal exudate, posterior oropharyngeal edema, posterior oropharyngeal erythema or tonsillar abscesses.   Eyes: Conjunctivae are normal. Right eye exhibits no discharge. Left eye exhibits no discharge.   Neck: Normal range of motion.   Cardiovascular: Normal rate, regular rhythm and normal heart sounds.    No murmur heard.  Pulmonary/Chest: Effort normal. No accessory muscle usage. No respiratory distress. He has no decreased breath sounds. He has no wheezes. He has no rhonchi. He has no rales. He exhibits no tenderness.   Abdominal:  Soft. There is no tenderness.   Musculoskeletal: Normal range of motion. He exhibits no edema.   Neurological: He is alert and oriented to person, place, and time.   Skin: Skin is warm and dry. He is not diaphoretic. No erythema.   Psychiatric: He has a normal mood and affect. His behavior is normal.   Nursing note and vitals reviewed.      Assessment:       1. Viral URI with cough    2. Essential hypertension    3. Tobacco abuse        Plan:       Moris was seen today for uri.    Diagnoses and all orders for this visit:    Viral URI with cough  -     dextromethorphan-guaifenesin  mg (MUCINEX DM)  mg per 12 hr tablet; Take 2 tablets by mouth 2 (two) times daily.  -     benzonatate (TESSALON) 100 MG capsule; Take 1 capsule (100 mg total) by mouth 3 (three) times daily as needed for Cough.  Rest  Lots of fluids  Appears viral, however, if not improving at the end of the week, he will call me for consideration of antibiotics at that time    Essential hypertension  Admits he is out of bp meds as well as gerd meds x 2 months. Requesting 90 day supply, will message Primary Care Physician    Tobacco abuse

## 2017-08-04 NOTE — TELEPHONE ENCOUNTER
Left message on voice mail informing pt that he will need to be seen by Dr. Tilley before she can give refills. Pt hasn't been seen by  in 2 years.

## 2017-08-09 ENCOUNTER — OFFICE VISIT (OUTPATIENT)
Dept: URGENT CARE | Facility: CLINIC | Age: 42
End: 2017-08-09
Payer: MEDICARE

## 2017-08-09 VITALS
TEMPERATURE: 98 F | DIASTOLIC BLOOD PRESSURE: 98 MMHG | BODY MASS INDEX: 25.5 KG/M2 | SYSTOLIC BLOOD PRESSURE: 147 MMHG | WEIGHT: 188.25 LBS | HEIGHT: 72 IN | OXYGEN SATURATION: 98 % | HEART RATE: 81 BPM

## 2017-08-09 DIAGNOSIS — R05.9 COUGH: Primary | ICD-10-CM

## 2017-08-09 DIAGNOSIS — R09.81 CONGESTION OF NASAL SINUS: ICD-10-CM

## 2017-08-09 PROCEDURE — 99213 OFFICE O/P EST LOW 20 MIN: CPT | Mod: PBBFAC,PO | Performed by: PHYSICIAN ASSISTANT

## 2017-08-09 PROCEDURE — 3080F DIAST BP >= 90 MM HG: CPT | Mod: ,,, | Performed by: PHYSICIAN ASSISTANT

## 2017-08-09 PROCEDURE — 3077F SYST BP >= 140 MM HG: CPT | Mod: ,,, | Performed by: PHYSICIAN ASSISTANT

## 2017-08-09 PROCEDURE — 99999 PR PBB SHADOW E&M-EST. PATIENT-LVL III: CPT | Mod: PBBFAC,,, | Performed by: PHYSICIAN ASSISTANT

## 2017-08-09 PROCEDURE — 99213 OFFICE O/P EST LOW 20 MIN: CPT | Mod: S$PBB,,, | Performed by: PHYSICIAN ASSISTANT

## 2017-08-09 RX ORDER — FLUTICASONE PROPIONATE 50 MCG
1 SPRAY, SUSPENSION (ML) NASAL DAILY
Qty: 1 BOTTLE | Refills: 0 | Status: SHIPPED | OUTPATIENT
Start: 2017-08-09

## 2017-08-09 RX ORDER — BENZONATATE 200 MG/1
200 CAPSULE ORAL 3 TIMES DAILY PRN
Qty: 30 CAPSULE | Refills: 0 | Status: SHIPPED | OUTPATIENT
Start: 2017-08-09 | End: 2017-08-17

## 2017-08-09 NOTE — PATIENT INSTRUCTIONS

## 2017-08-09 NOTE — PROGRESS NOTES
Subjective:      Patient ID: Moris Palmer Jr. is a 41 y.o. male.    Chief Complaint: Cough    Mr. Palmer is a 42yo male that presents to Urgent Care for persistent cough.  Patient was recently seen in Urgent Care and diagnosed with viral URI.  Patient states other symptoms have improved but his cough has not gotten any better.  Patient states he has not been taking the Mucinex or tessalon.        Cough   This is a new problem. The current episode started 1 to 4 weeks ago. The problem has been unchanged (Congestion symptoms improving). Cough characteristics: Mildly productive. Associated symptoms include headaches. Pertinent negatives include no chills, ear pain, fever (last week 101), rhinorrhea, sore throat, shortness of breath or wheezing. Treatments tried: Mucinex, tessalon.     Review of Systems   Constitutional: Negative for chills, diaphoresis and fever (last week 101).   HENT: Positive for congestion (worse in morning). Negative for ear pain, rhinorrhea, sinus pressure, sneezing and sore throat.    Respiratory: Positive for cough (worse when laying down and first thing in morning). Negative for shortness of breath and wheezing.    Gastrointestinal: Negative for abdominal pain, diarrhea, nausea and vomiting.   Neurological: Positive for headaches. Negative for dizziness and light-headedness.       Objective:   BP (!) 147/98 (BP Location: Right arm, Patient Position: Sitting, BP Method: Manual)   Pulse 81   Temp 98.4 °F (36.9 °C) (Tympanic)   Ht 6' (1.829 m)   Wt 85.4 kg (188 lb 4.4 oz)   SpO2 98%   BMI 25.53 kg/m²   Physical Exam   Constitutional: He appears well-developed and well-nourished. He does not appear ill. No distress.   HENT:   Head: Normocephalic and atraumatic.   Right Ear: Tympanic membrane and ear canal normal. Tympanic membrane is not erythematous. No middle ear effusion.   Left Ear: Tympanic membrane and ear canal normal. Tympanic membrane is not erythematous.  No middle ear  effusion.   Nose: Rhinorrhea present. Right sinus exhibits no maxillary sinus tenderness and no frontal sinus tenderness. Left sinus exhibits no maxillary sinus tenderness and no frontal sinus tenderness.   Mouth/Throat: Uvula is midline and oropharynx is clear and moist.   Cardiovascular: Normal rate, regular rhythm and normal heart sounds.    No murmur heard.  Pulmonary/Chest: Effort normal and breath sounds normal. No respiratory distress. He has no decreased breath sounds. He has no wheezes. He has no rhonchi. He has no rales.   Patient does not cough during clinical exam   Skin: Skin is warm and dry. He is not diaphoretic.   Psychiatric: He has a normal mood and affect. His speech is normal and behavior is normal. Thought content normal.     Assessment:      1. Cough    2. Congestion of nasal sinus       Plan:   Cough  -     benzonatate (TESSALON) 200 MG capsule; Take 1 capsule (200 mg total) by mouth 3 (three) times daily as needed for Cough.  Dispense: 30 capsule; Refill: 0    Congestion of nasal sinus  -     fluticasone (FLONASE) 50 mcg/actuation nasal spray; 1 spray by Each Nare route once daily.  Dispense: 1 Bottle; Refill: 0    Gave patient handout on cough.  Printed and reviewed AVS.    Offer CXR, patient declines.  Explained to patient that clear lungs and lack of fever, risk for PNA is low and I do not feel an x-ray is warranted at this time.  Cough following URI can last for several weeks.  Continue symptomatic care.  Patient requesting new prescription for tessalon as [place]     Further patient instruction:  Take medications as directed.  Stop smoking/avoid smoke.  Increase fluids.  Warm liquids may help.  Rest.  Cool mist humidifier may help.  May take Mucinex for congestion.  Follow up in clinic if not improving in 1 week, sooner if worse.  To ER with difficulty breathing or shortness of breath at rest.     Discussed with patient elevated BP.   Appt set up in office for next week for follow up with PCP and refill on medication.  Advise patient to keep BP journal in the meantime.

## 2017-08-17 ENCOUNTER — LAB VISIT (OUTPATIENT)
Dept: LAB | Facility: HOSPITAL | Age: 42
End: 2017-08-17
Attending: FAMILY MEDICINE
Payer: MEDICARE

## 2017-08-17 ENCOUNTER — OFFICE VISIT (OUTPATIENT)
Dept: INTERNAL MEDICINE | Facility: CLINIC | Age: 42
End: 2017-08-17
Payer: MEDICARE

## 2017-08-17 VITALS
WEIGHT: 189.81 LBS | HEART RATE: 72 BPM | DIASTOLIC BLOOD PRESSURE: 90 MMHG | HEIGHT: 72 IN | BODY MASS INDEX: 25.71 KG/M2 | SYSTOLIC BLOOD PRESSURE: 148 MMHG | TEMPERATURE: 97 F

## 2017-08-17 DIAGNOSIS — K21.9 GASTROESOPHAGEAL REFLUX DISEASE, ESOPHAGITIS PRESENCE NOT SPECIFIED: ICD-10-CM

## 2017-08-17 DIAGNOSIS — E78.5 HYPERLIPIDEMIA, UNSPECIFIED HYPERLIPIDEMIA TYPE: Primary | ICD-10-CM

## 2017-08-17 DIAGNOSIS — F41.9 ANXIETY: ICD-10-CM

## 2017-08-17 DIAGNOSIS — E78.5 HYPERLIPIDEMIA, UNSPECIFIED HYPERLIPIDEMIA TYPE: ICD-10-CM

## 2017-08-17 DIAGNOSIS — L03.90 CELLULITIS, UNSPECIFIED CELLULITIS SITE: ICD-10-CM

## 2017-08-17 DIAGNOSIS — I10 ESSENTIAL HYPERTENSION: ICD-10-CM

## 2017-08-17 LAB
ALBUMIN SERPL BCP-MCNC: 3.8 G/DL
ALP SERPL-CCNC: 68 U/L
ALT SERPL W/O P-5'-P-CCNC: 18 U/L
ANION GAP SERPL CALC-SCNC: 9 MMOL/L
AST SERPL-CCNC: 15 U/L
BILIRUB SERPL-MCNC: 0.4 MG/DL
BUN SERPL-MCNC: 12 MG/DL
CALCIUM SERPL-MCNC: 8.8 MG/DL
CHLORIDE SERPL-SCNC: 107 MMOL/L
CHOLEST/HDLC SERPL: 3.1 {RATIO}
CO2 SERPL-SCNC: 25 MMOL/L
CREAT SERPL-MCNC: 0.9 MG/DL
EST. GFR  (AFRICAN AMERICAN): >60 ML/MIN/1.73 M^2
EST. GFR  (NON AFRICAN AMERICAN): >60 ML/MIN/1.73 M^2
GLUCOSE SERPL-MCNC: 84 MG/DL
HDL/CHOLESTEROL RATIO: 32.6 %
HDLC SERPL-MCNC: 141 MG/DL
HDLC SERPL-MCNC: 46 MG/DL
LDLC SERPL CALC-MCNC: 70.2 MG/DL
NONHDLC SERPL-MCNC: 95 MG/DL
POTASSIUM SERPL-SCNC: 4.7 MMOL/L
PROT SERPL-MCNC: 6.8 G/DL
SODIUM SERPL-SCNC: 141 MMOL/L
TRIGL SERPL-MCNC: 124 MG/DL

## 2017-08-17 PROCEDURE — 99213 OFFICE O/P EST LOW 20 MIN: CPT | Mod: PBBFAC,PO | Performed by: FAMILY MEDICINE

## 2017-08-17 PROCEDURE — 3080F DIAST BP >= 90 MM HG: CPT | Mod: ,,, | Performed by: FAMILY MEDICINE

## 2017-08-17 PROCEDURE — 80061 LIPID PANEL: CPT

## 2017-08-17 PROCEDURE — 3077F SYST BP >= 140 MM HG: CPT | Mod: ,,, | Performed by: FAMILY MEDICINE

## 2017-08-17 PROCEDURE — 99999 PR PBB SHADOW E&M-EST. PATIENT-LVL III: CPT | Mod: PBBFAC,,, | Performed by: FAMILY MEDICINE

## 2017-08-17 PROCEDURE — 80053 COMPREHEN METABOLIC PANEL: CPT

## 2017-08-17 PROCEDURE — 99214 OFFICE O/P EST MOD 30 MIN: CPT | Mod: S$PBB,,, | Performed by: FAMILY MEDICINE

## 2017-08-17 PROCEDURE — 36415 COLL VENOUS BLD VENIPUNCTURE: CPT | Mod: PO

## 2017-08-17 RX ORDER — LOSARTAN POTASSIUM 50 MG/1
50 TABLET ORAL DAILY
Qty: 90 TABLET | Refills: 1 | Status: SHIPPED | OUTPATIENT
Start: 2017-08-17 | End: 2018-02-13 | Stop reason: SDUPTHER

## 2017-08-17 RX ORDER — SERTRALINE HYDROCHLORIDE 50 MG/1
50 TABLET, FILM COATED ORAL NIGHTLY
Qty: 30 TABLET | Refills: 3 | Status: SHIPPED | OUTPATIENT
Start: 2017-08-17 | End: 2017-09-28 | Stop reason: SDUPTHER

## 2017-08-17 RX ORDER — PANTOPRAZOLE SODIUM 40 MG/1
40 TABLET, DELAYED RELEASE ORAL DAILY
Qty: 90 TABLET | Refills: 1 | Status: SHIPPED | OUTPATIENT
Start: 2017-08-17 | End: 2018-02-13 | Stop reason: SDUPTHER

## 2017-08-17 RX ORDER — ATORVASTATIN CALCIUM 40 MG/1
1 TABLET, FILM COATED ORAL DAILY
COMMUNITY
Start: 2017-08-05 | End: 2018-05-18 | Stop reason: SDUPTHER

## 2017-08-17 NOTE — PROGRESS NOTES
"Subjective:      Patient ID: Moris Palmer Jr. is a 41 y.o. male.    Chief Complaint: Medication Refill    HPI  40 yo male whom I have not seen for 2 yrs here for f/u on BP.  Out of meds for past 2 mos.  Back on statin now.  Followed by outside pain management, on chronic pain meds for back.  Having issues with anxiety, used to be on Paxil 4-5 yrs ago.  Feels he needs something.  Gets anxious, picks his arms/skin and is getting infections.  Has a few open/scabbed over areas now.  Using bactroban PRN.  Has been seen a few times for I&D.  Needs PPI for GERD, been out and is having bad heartburn.  Protonix was helpful.  Bowels moving well.    Past Medical History:   Diagnosis Date    Allergy     Anxiety     Asthma     Chronic low back pain     COPD (chronic obstructive pulmonary disease)     Depression     Hyperlipidemia     Hypertension      Family History   Problem Relation Age of Onset    Depression Mother     Anxiety disorder Mother     Diabetes Paternal Grandmother     Heart disease Neg Hx     Stroke Neg Hx     Colon cancer Neg Hx     Prostate cancer Neg Hx      Past Surgical History:   Procedure Laterality Date    back fusion      CARPAL TUNNEL RELEASE Bilateral     neck fusion       Social History   Substance Use Topics    Smoking status: Current Every Day Smoker     Packs/day: 0.15     Years: 20.00     Types: Cigarettes    Smokeless tobacco: Never Used    Alcohol use 0.0 oz/week      Comment: Occ use       BP (!) 148/90   Pulse 72   Temp 97.2 °F (36.2 °C) (Tympanic)   Ht 5' 11.75" (1.822 m)   Wt 86.1 kg (189 lb 13.1 oz)   BMI 25.92 kg/m²     Review of Systems   Constitutional: Negative for chills, diaphoresis, fatigue, fever and unexpected weight change.   HENT: Negative for hearing loss and tinnitus.    Eyes: Negative for visual disturbance.   Respiratory: Negative for cough, chest tightness, shortness of breath and wheezing.    Cardiovascular: Negative for chest pain, " palpitations and leg swelling.   Gastrointestinal: Negative for abdominal distention and abdominal pain.   Genitourinary: Negative for difficulty urinating.   Musculoskeletal: Positive for arthralgias and back pain.   Skin: Positive for wound.   Neurological: Negative for dizziness, weakness and headaches.   Psychiatric/Behavioral: The patient is nervous/anxious.      Objective:     Physical Exam   Constitutional: He is oriented to person, place, and time. He appears well-developed and well-nourished. No distress.   HENT:   Right Ear: External ear normal.   Left Ear: External ear normal.   Nose: Nose normal.   Mouth/Throat: Oropharynx is clear and moist.   Eyes: Pupils are equal, round, and reactive to light.   Neck: Normal range of motion. Neck supple.   Cardiovascular: Normal rate and regular rhythm.    No murmur heard.  Pulmonary/Chest: Effort normal and breath sounds normal. No respiratory distress. He has no wheezes.   Abdominal: Soft. Bowel sounds are normal. He exhibits no distension. There is no tenderness.   Genitourinary: No penile tenderness.   Musculoskeletal: He exhibits no edema.   Neurological: He is alert and oriented to person, place, and time. No cranial nerve deficit.   Skin: Skin is warm and dry. He is not diaphoretic.   Several areas on arm and one on the L buttocks that is scabbed over.  No induration/drainage.   Psychiatric: He has a normal mood and affect. His behavior is normal. Judgment and thought content normal.   Nursing note and vitals reviewed.      Lab Results   Component Value Date    WBC 10.17 03/20/2017    HGB 14.9 03/20/2017    HCT 46.2 03/20/2017     03/20/2017    CHOL 242 (H) 03/20/2017    TRIG 130 03/20/2017    HDL 39 (L) 03/20/2017    ALT 15 03/20/2017    AST 15 03/20/2017     03/20/2017    K 4.7 03/20/2017     03/20/2017    CREATININE 1.0 03/20/2017    BUN 19 03/20/2017    CO2 24 03/20/2017    TSH 1.086 03/20/2017    HGBA1C 5.7 09/10/2015       Assessment:      1. Hyperlipidemia, unspecified hyperlipidemia type    2. Essential hypertension    3. Anxiety    4. Cellulitis, unspecified cellulitis site    5. Gastroesophageal reflux disease, esophagitis presence not specified       Plan:   Hyperlipidemia, unspecified hyperlipidemia type  -     Comprehensive metabolic panel; Future; Expected date: 08/17/2017  -     Lipid panel; Future; Expected date: 08/17/2017    Essential hypertension  -     losartan (COZAAR) 50 MG tablet; Take 1 tablet (50 mg total) by mouth once daily.  Dispense: 90 tablet; Refill: 1    Anxiety    Cellulitis, unspecified cellulitis site    Gastroesophageal reflux disease, esophagitis presence not specified  -     pantoprazole (PROTONIX) 40 MG tablet; Take 1 tablet (40 mg total) by mouth once daily.  Dispense: 90 tablet; Refill: 1    Other orders  -     sertraline (ZOLOFT) 50 MG tablet; Take 1 tablet (50 mg total) by mouth every evening.  Dispense: 30 tablet; Refill: 3    BP uncontrolled.  Start Losartan 50mg.    Start Zoloft 50mg nightly for anxiety.  Recheck in 6 wks  Check lipids/CMP since starting statin  Refill PPI for GERD  Use antibacterial soap, hibicleanse and topical bactroban.  Avoid scratching!  F/U short term for BP check and mood check

## 2017-08-28 ENCOUNTER — OFFICE VISIT (OUTPATIENT)
Dept: URGENT CARE | Facility: CLINIC | Age: 42
End: 2017-08-28
Payer: MEDICARE

## 2017-08-28 VITALS
TEMPERATURE: 97 F | OXYGEN SATURATION: 96 % | WEIGHT: 191.56 LBS | SYSTOLIC BLOOD PRESSURE: 130 MMHG | HEART RATE: 87 BPM | DIASTOLIC BLOOD PRESSURE: 94 MMHG | BODY MASS INDEX: 26.16 KG/M2

## 2017-08-28 DIAGNOSIS — Z72.0 TOBACCO ABUSE: ICD-10-CM

## 2017-08-28 DIAGNOSIS — L02.429: Primary | ICD-10-CM

## 2017-08-28 DIAGNOSIS — L28.1: ICD-10-CM

## 2017-08-28 PROCEDURE — 99214 OFFICE O/P EST MOD 30 MIN: CPT | Mod: S$PBB,,, | Performed by: NURSE PRACTITIONER

## 2017-08-28 PROCEDURE — 99215 OFFICE O/P EST HI 40 MIN: CPT | Mod: PBBFAC,PO | Performed by: NURSE PRACTITIONER

## 2017-08-28 PROCEDURE — 3075F SYST BP GE 130 - 139MM HG: CPT | Mod: ,,, | Performed by: NURSE PRACTITIONER

## 2017-08-28 PROCEDURE — 99999 PR PBB SHADOW E&M-EST. PATIENT-LVL V: CPT | Mod: PBBFAC,,, | Performed by: NURSE PRACTITIONER

## 2017-08-28 PROCEDURE — 3080F DIAST BP >= 90 MM HG: CPT | Mod: ,,, | Performed by: NURSE PRACTITIONER

## 2017-08-28 RX ORDER — DOXYCYCLINE 100 MG/1
100 CAPSULE ORAL EVERY 12 HOURS
Qty: 20 CAPSULE | Refills: 0 | Status: SHIPPED | OUTPATIENT
Start: 2017-08-28 | End: 2017-09-07

## 2017-08-28 RX ORDER — CLINDAMYCIN PHOSPHATE 10 MG/ML
SOLUTION TOPICAL 2 TIMES DAILY
Qty: 60 EACH | Refills: 1 | Status: SHIPPED | OUTPATIENT
Start: 2017-08-28 | End: 2017-08-29

## 2017-08-28 RX ORDER — BENZOYL PEROXIDE 100 MG/ML
LIQUID TOPICAL 2 TIMES DAILY
Qty: 227 G | Refills: 1 | Status: SHIPPED | OUTPATIENT
Start: 2017-08-28 | End: 2017-08-29

## 2017-08-28 NOTE — PROGRESS NOTES
Subjective:       Patient ID: Moris Palmer Jr. is a 41 y.o. male.    Chief Complaint: Other (arm lesion)    Patient presents to Urgent Care with concern of lesions to arms from picking. He was recently started on zoloft but he has not been on it long enough to evaluate effectiveness. Last culture of lesions showed MRSA. He was instructed to see derm but has not yet. No fever. He reports pain to the lesions. He is using dial soap, hibiclens, and bactroban with no improvement,  no fever.        BP (!) 130/94 (BP Location: Left arm, Patient Position: Sitting, BP Method: Medium (Manual))   Pulse 87   Temp 97.1 °F (36.2 °C) (Tympanic)   Wt 86.9 kg (191 lb 9.3 oz)   SpO2 96%   BMI 26.16 kg/m²     Review of Systems   Constitutional: Negative for activity change, appetite change, chills, diaphoresis, fatigue, fever and unexpected weight change.   HENT: Negative.    Eyes: Negative.    Respiratory: Negative for apnea, cough, choking, chest tightness, shortness of breath, wheezing and stridor.    Cardiovascular: Negative for chest pain, palpitations and leg swelling.   Gastrointestinal: Negative.    Endocrine: Negative.    Genitourinary: Negative.    Musculoskeletal: Negative for arthralgias and myalgias.   Skin: Positive for color change and wound. Negative for pallor and rash.   Allergic/Immunologic: Negative.    Neurological: Negative for dizziness, facial asymmetry, light-headedness and headaches.   Hematological: Negative for adenopathy.   Psychiatric/Behavioral: Negative for agitation and behavioral problems. The patient is nervous/anxious.        Objective:      Physical Exam   Constitutional: He is oriented to person, place, and time. He appears well-developed and well-nourished. No distress.   HENT:   Head: Normocephalic and atraumatic.   Cardiovascular: Normal rate.    Pulmonary/Chest: Effort normal. No respiratory distress.   Neurological: He is alert and oriented to person, place, and time.   Skin:  Skin is warm and dry. No rash noted. He is not diaphoretic. There is erythema.   Furuncles to right and left upper arms, right worse than left. One oozing purulent drainage. There is mild surrounding erythema. There is scattered hyperpigmented scarring to upper arms.   Psychiatric: He has a normal mood and affect. His behavior is normal. Judgment and thought content normal.   Nursing note and vitals reviewed.      Assessment:       1. Furuncle of upper arm    2. 's papule    3. Tobacco abuse        Plan:       Moris was seen today for other.    Diagnoses and all orders for this visit:    Furuncle of upper arm  -     clindamycin (CLEOCIN T) 1 % Swab; Apply topically 2 (two) times daily.  -     benzoyl peroxide (BP WASH) 10 % external wash; Apply topically 2 (two) times daily.  -     doxycycline (VIBRAMYCIN) 100 MG Cap; Take 1 capsule (100 mg total) by mouth every 12 (twelve) hours.  -     Ambulatory referral to Dermatology  Discussed continue zoloft. Follow up with Primary Care Physician as scheduled to evaluate effectiveness  Advised bp wash can bleach things, to rinse well  Derm for continued care    's papule  -     clindamycin (CLEOCIN T) 1 % Swab; Apply topically 2 (two) times daily.  -     benzoyl peroxide (BP WASH) 10 % external wash; Apply topically 2 (two) times daily.  -     doxycycline (VIBRAMYCIN) 100 MG Cap; Take 1 capsule (100 mg total) by mouth every 12 (twelve) hours.  -     Ambulatory referral to Dermatology    Tobacco abuse  Advised slows wound healing, advised quitting.

## 2017-08-28 NOTE — PATIENT INSTRUCTIONS
Abscess (Antibiotic Treatment Only)  An abscess (sometimes called a boil) happens when bacteria get trapped under the skin and start to grow. Pus forms inside the abscess as the body responds to the bacteria. An abscess can happen with an insect bite, ingrown hair, blocked oil gland, pimple, cyst, or puncture wound.  In the early stages, your wound may be red and tender. For this stage, you may get antibiotics. If the abscess does not get better with antibiotics, it will need to be drained with a small cut.  Home care  These tips will help you care for your abscess at home:  · Soak the wound in hot water or apply hot packs (small towel soaked in hot water) to the area for 20 minutes at a time. Do this 3 to 4 times a day.  · Do not cut, squeeze, or pop the boil yourself.  · Apply antibiotic cream or ointment to the skin 3 to 4 times a day, unless something else was prescribed. Some ointments include an antibiotic plus a pain reliever.  · If your doctor prescribed antibiotics, do not stop taking them until you have finished the medicine or the doctor tells you to stop.  · You may use an over-the-counter pain medicine to control pain, unless another pain medicine was prescribed. If you have chronic liver or kidney disease or ever had a stomach ulcer or gastrointestinal bleeding, talk with your doctor before using these any of these.  Follow-up care  Follow up with your healthcare provider, or as advised. Check your wound each day for the signs of worsening infection listed below.  When to seek medical advice  Get prompt medical attention if any of these occur:  · An increase in redness or swelling  · Red streaks in the skin leading away from the abscess  · An increase in local pain or swelling  · Fever of 100.4ºF (38ºC) or higher, or as directed by your healthcare provider  · Pus or fluid coming from the abscess  · Boil returns after getting better  Date Last Reviewed: 9/1/2016  © 3211-3811 [employer] [address] [place] [id number]. All rights reserved. This information is not intended as a substitute for professional medical care. Always follow your healthcare professional's instructions.

## 2017-08-29 ENCOUNTER — INITIAL CONSULT (OUTPATIENT)
Dept: DERMATOLOGY | Facility: CLINIC | Age: 42
End: 2017-08-29
Payer: MEDICARE

## 2017-08-29 DIAGNOSIS — L02.92 RECURRENT BOILS: Primary | ICD-10-CM

## 2017-08-29 PROCEDURE — 87186 SC STD MICRODIL/AGAR DIL: CPT

## 2017-08-29 PROCEDURE — 99999 PR PBB SHADOW E&M-EST. PATIENT-LVL III: CPT | Mod: PBBFAC,,, | Performed by: DERMATOLOGY

## 2017-08-29 PROCEDURE — 87077 CULTURE AEROBIC IDENTIFY: CPT

## 2017-08-29 PROCEDURE — 87070 CULTURE OTHR SPECIMN AEROBIC: CPT

## 2017-08-29 PROCEDURE — 99202 OFFICE O/P NEW SF 15 MIN: CPT | Mod: S$PBB,,, | Performed by: DERMATOLOGY

## 2017-08-29 PROCEDURE — 99213 OFFICE O/P EST LOW 20 MIN: CPT | Mod: PBBFAC,PO | Performed by: DERMATOLOGY

## 2017-08-29 RX ORDER — CLINDAMYCIN PHOSPHATE 11.9 MG/ML
SOLUTION TOPICAL 2 TIMES DAILY
Qty: 60 ML | Refills: 3 | Status: SHIPPED | OUTPATIENT
Start: 2017-08-29

## 2017-08-29 NOTE — PROGRESS NOTES
Subjective:       Patient ID:  Moris Palmer Jr. is a 41 y.o. male who presents for   Chief Complaint   Patient presents with    Lesion     c/o lesions to bilateral arms and butt x several yrs,,itchy & mild pain,,tx with bactroban     History of Present Illness: The patient presents with chief complaint of recurrent lesions. Previously seen by NP Rashel  Location: bilateral arms, buttocks  Duration: several years  Signs/Symptoms: pruritus, mildly painful    Prior treatments: bactroban, doxy 100 mg bid  x 1 days out of 10, oxi swabs, hibiclens          Review of Systems   Constitutional: Negative for fever and chills.   Gastrointestinal: Negative for nausea and vomiting.   Skin: Positive for itching and rash. Negative for daily sunscreen use, activity-related sunscreen use and recent sunburn.   Hematologic/Lymphatic: Does not bruise/bleed easily.        Objective:    Physical Exam   Constitutional: He appears well-developed and well-nourished. No distress.   Neurological: He is alert and oriented to person, place, and time. He is not disoriented.   Psychiatric: He has a normal mood and affect.   Skin:   Areas Examined (abnormalities noted in diagram):   Head / Face Inspection Performed  Neck Inspection Performed  Chest / Axilla Inspection Performed  Abdomen Inspection Performed  Genitals / Buttocks / Groin Inspection Performed  Back Inspection Performed  RUE Inspected  LUE Inspection Performed  RLE Inspected  LLE Inspection Performed  Nails and Digits Inspection Performed              Diagram Legend     Erythematous scaling macule/papule c/w actinic keratosis       Vascular papule c/w angioma      Pigmented verrucoid papule/plaque c/w seborrheic keratosis      Yellow umbilicated papule c/w sebaceous hyperplasia      Irregularly shaped tan macule c/w lentigo     1-2 mm smooth white papules consistent with Milia      Movable subcutaneous cyst with punctum c/w epidermal inclusion cyst      Subcutaneous  movable cyst c/w pilar cyst      Firm pink to brown papule c/w dermatofibroma      Pedunculated fleshy papule(s) c/w skin tag(s)      Evenly pigmented macule c/w junctional nevus     Mildly variegated pigmented, slightly irregular-bordered macule c/w mildly atypical nevus      Flesh colored to evenly pigmented papule c/w intradermal nevus       Pink pearly papule/plaque c/w basal cell carcinoma      Erythematous hyperkeratotic cursted plaque c/w SCC      Surgical scar with no sign of skin cancer recurrence      Open and closed comedones      Inflammatory papules and pustules      Verrucoid papule consistent consistent with wart     Erythematous eczematous patches and plaques     Dystrophic onycholytic nail with subungual debris c/w onychomycosis     Umbilicated papule    Erythematous-base heme-crusted tan verrucoid plaque consistent with inflamed seborrheic keratosis     Erythematous Silvery Scaling Plaque c/w Psoriasis     See annotation      Assessment / Plan:        Recurrent boils  -     clindamycin (CLEOCIN T) 1 % external solution; Apply topically 2 (two) times daily.  Dispense: 60 mL; Refill: 3  -     Aerobic culture  -     Discussed d/c picking areas due to spread of infection.  Continue doxy.  Discussed hibiclens showers and bleach baths.  Will add clinda solution.  Culture done today.  Will notify pt if doxy not sensitive.              Return if symptoms worsen or fail to improve.

## 2017-08-29 NOTE — PATIENT INSTRUCTIONS
Start hibiclens (chlorahexidine) showers 2 times per week  Recommend dilute bleach baths 2 times per week and discussed protocol -- add 1/4 cup of bleach to lukewarm water and soak for 10 - 15 minutes.        Understanding Carbuncles    A carbuncle is a painful boil under the skin. It happens when a group of hair follicles become infected. Follicles are the tiny holes from which hair grows out of your skin.  How to say it  Jenna   What causes carbuncles?  A carbuncle is caused by an infection with the bacteria Staphylococcus aureus. They are common on areas of the body where friction and sweat occur. They usually appear on the back of the neck, back, and thighs. This type of infection can also happen when the skin is injured, such as by a cut or bug bite.  The bacteria that causes carbuncles can spread easily from person to person. People at higher risk for boils are those with diabetes or a weak immune system. Drug users who use needles are also more likely to get them.  Symptoms of carbuncles  A carbuncle starts as a small painful bump. But it can grow quickly. It may become:  · Red  · Swollen  · Tender  Carbuncles may ooze pus. They may also cause fever and a general feeling of illness.  Treatment for carbuncles  A carbuncle may heal on its own in a few weeks. But the pus within it needs to come out first. Treatment options include:  · Warm compress. Putting a warm, wet washcloth on the boil will help the pus drain out. You should never try to pop a boil. That can cause the infection to spread.  · Surgical drainage. If the boil doesnt drain on its own, your healthcare provider may need to cut into it.  · Antibiotics. In some cases, oral antibiotics may be prescribed to fight the infection, especially if the carbuncle returns. You will likely have to take the medicine for 5 to 7 days. You may need to take it longer for a severe case.  · Good hygiene. Proper handwashing can prevent boils from spreading and  coming back. Also wash things that have been in contact with the carbuncle in hot water. This includes items such as clothing and towels.  Complications of carbuncles  The main complication of a carbuncle is the spreading of the infection. The bacteria can infect the heart and bone. It can also lead to septic shock, an infection of the entire body.  When to call your healthcare provider  Call your healthcare provider right away if you have any of these:  · Fever of 100.4°F (38°C) or higher, or as directed  · Redness, swelling, or fluid leaking from a carbuncle that gets worse  · Pain that gets worse  · Symptoms that dont get better, or get worse  · New symptoms   Date Last Reviewed: 5/1/2016  © 3243-3354 [employer] [address] [place] [address], [place] [address]. All rights reserved. This information is not intended as a substitute for professional medical care. Always follow your healthcare professional's instructions.

## 2017-08-29 NOTE — LETTER
August 29, 2017      Kadi Kiser, [facility]  [address] [place] [place] [address]           [facility]  [address]  [place] [address]  Phone: [phone]  Fax: [phone]          Patient: Moris Palmer Jr.   MR Number: [id number]   YOB: 1975   Date of Visit: 8/29/2017       Dear Kadi Kiser:    Thank you for referring Moris Palmer to me for evaluation. Attached you will find relevant portions of my assessment and plan of care.    If you have questions, please do not hesitate to call me. I look forward to following Moris Palmer along with you.    Sincerely,    Radha Shelton MD    Enclosure  CC:  No Recipients    If you would like to receive this communication electronically, please contact [email or web address] or [phone] to request more information on [employer] access.    For providers and/or their staff who would like to refer a patient to [facility], please contact us through our one-stop-shop provider referral line, [facility], at [phone].    If you feel you have received this communication in error or would no longer like to receive these types of communications, please e-mail [email or web address]

## 2017-09-01 LAB — BACTERIA SPEC AEROBE CULT: NORMAL

## 2017-09-06 ENCOUNTER — TELEPHONE (OUTPATIENT)
Dept: DERMATOLOGY | Facility: CLINIC | Age: 42
End: 2017-09-06

## 2017-09-06 NOTE — TELEPHONE ENCOUNTER
----- Message from Kim Rivera sent at 9/6/2017  8:46 AM CDT -----  Contact: pt   ..Pt was returning nurse call.    ..[phone] (home)

## 2017-09-28 ENCOUNTER — OFFICE VISIT (OUTPATIENT)
Dept: INTERNAL MEDICINE | Facility: CLINIC | Age: 42
End: 2017-09-28
Payer: MEDICARE

## 2017-09-28 VITALS
HEART RATE: 68 BPM | WEIGHT: 194.69 LBS | TEMPERATURE: 97 F | DIASTOLIC BLOOD PRESSURE: 84 MMHG | HEIGHT: 72 IN | SYSTOLIC BLOOD PRESSURE: 126 MMHG | BODY MASS INDEX: 26.37 KG/M2

## 2017-09-28 DIAGNOSIS — E78.5 HYPERLIPIDEMIA, UNSPECIFIED HYPERLIPIDEMIA TYPE: ICD-10-CM

## 2017-09-28 DIAGNOSIS — I10 ESSENTIAL HYPERTENSION: Primary | ICD-10-CM

## 2017-09-28 DIAGNOSIS — F41.9 ANXIETY: ICD-10-CM

## 2017-09-28 PROCEDURE — 99214 OFFICE O/P EST MOD 30 MIN: CPT | Mod: S$PBB,,, | Performed by: FAMILY MEDICINE

## 2017-09-28 PROCEDURE — 3079F DIAST BP 80-89 MM HG: CPT | Mod: ,,, | Performed by: FAMILY MEDICINE

## 2017-09-28 PROCEDURE — 99214 OFFICE O/P EST MOD 30 MIN: CPT | Mod: PBBFAC,PO | Performed by: FAMILY MEDICINE

## 2017-09-28 PROCEDURE — G0008 ADMIN INFLUENZA VIRUS VAC: HCPCS | Mod: PBBFAC,PO

## 2017-09-28 PROCEDURE — 99999 PR PBB SHADOW E&M-EST. PATIENT-LVL IV: CPT | Mod: PBBFAC,,, | Performed by: FAMILY MEDICINE

## 2017-09-28 PROCEDURE — 3074F SYST BP LT 130 MM HG: CPT | Mod: ,,, | Performed by: FAMILY MEDICINE

## 2017-09-28 RX ORDER — SERTRALINE HYDROCHLORIDE 100 MG/1
100 TABLET, FILM COATED ORAL NIGHTLY
Qty: 30 TABLET | Refills: 6 | Status: SHIPPED | OUTPATIENT
Start: 2017-09-28 | End: 2018-09-28

## 2017-09-28 NOTE — PROGRESS NOTES
Subjective:      Patient ID: Moris Palmer Jr. is a 41 y.o. male.    Chief Complaint:  F/U BP/lipids/anxiety    HPI   42 yo male here today for f/u on chronic conditions.  He is taking all meds daily.  Not seen much change with the zoloft 50mg nightly.  No SE/problems with the med.  He has anxiety and is a .  Been seen for multiple cellulitis infections.  Saw Derm, Cx with MRSA.  Cleared up at this time, but still finds himself picking often and not even realizing he is doing it.    Normal BMs  No CP/SOB    Past Medical History:   Diagnosis Date    Allergy     Anxiety     Asthma     Chronic low back pain     COPD (chronic obstructive pulmonary disease)     Depression     Hyperlipidemia     Hypertension      Family History   Problem Relation Age of Onset    Depression Mother     Anxiety disorder Mother     Diabetes Paternal Grandmother     Heart disease Neg Hx     Stroke Neg Hx     Colon cancer Neg Hx     Prostate cancer Neg Hx      Past Surgical History:   Procedure Laterality Date    back fusion      CARPAL TUNNEL RELEASE Bilateral     neck fusion       Social History   Substance Use Topics    Smoking status: Current Every Day Smoker     Packs/day: 0.15     Years: 20.00     Types: Cigarettes    Smokeless tobacco: Never Used    Alcohol use 0.0 oz/week      Comment: Occ use       /84   Pulse 68   Temp 97.2 °F (36.2 °C) (Tympanic)   Ht 6' (1.829 m)   Wt 88.3 kg (194 lb 10.7 oz)   BMI 26.40 kg/m²     Review of Systems   Constitutional: Negative for activity change, appetite change, chills, diaphoresis, fatigue, fever and unexpected weight change.   HENT: Negative for hearing loss and tinnitus.    Eyes: Negative for visual disturbance.   Respiratory: Negative for cough, chest tightness, shortness of breath and wheezing.    Cardiovascular: Negative for chest pain, palpitations and leg swelling.   Gastrointestinal: Negative for abdominal distention and abdominal pain.    Genitourinary: Negative for difficulty urinating.   Neurological: Negative for dizziness, weakness and headaches.   Psychiatric/Behavioral: The patient is nervous/anxious.      Objective:     Physical Exam   Constitutional: He is oriented to person, place, and time. He appears well-developed and well-nourished.   HENT:   Mouth/Throat: Oropharynx is clear and moist.   Eyes: Pupils are equal, round, and reactive to light.   Neck: Normal range of motion. Neck supple.   Cardiovascular: Normal rate, regular rhythm and normal heart sounds.    Pulmonary/Chest: Effort normal and breath sounds normal. No respiratory distress. He has no wheezes.   Abdominal: Soft. Bowel sounds are normal. He exhibits no distension.   Neurological: He is alert and oriented to person, place, and time.   Skin: Skin is warm and dry. No erythema.   Nursing note and vitals reviewed.      Lab Results   Component Value Date    WBC 10.17 03/20/2017    HGB 14.9 03/20/2017    HCT 46.2 03/20/2017     03/20/2017    CHOL 141 08/17/2017    TRIG 124 08/17/2017    HDL 46 08/17/2017    ALT 18 08/17/2017    AST 15 08/17/2017     08/17/2017    K 4.7 08/17/2017     08/17/2017    CREATININE 0.9 08/17/2017    BUN 12 08/17/2017    CO2 25 08/17/2017    TSH 1.086 03/20/2017    HGBA1C 5.7 09/10/2015       Assessment:     1. Essential hypertension    2. Anxiety    3. Hyperlipidemia, unspecified hyperlipidemia type       Plan:   Essential hypertension  -     CBC auto differential; Future; Expected date: 03/28/2018  -     Comprehensive metabolic panel; Future; Expected date: 03/28/2018    Anxiety    Hyperlipidemia, unspecified hyperlipidemia type  -     TSH; Future; Expected date: 03/28/2018  -     Lipid panel; Future; Expected date: 03/28/2018    Other orders  -     sertraline (ZOLOFT) 100 MG tablet; Take 1 tablet (100 mg total) by mouth every evening.  Dispense: 30 tablet; Refill: 6    Bp better controlled, cont current regimen  Increase zoloft to  100mg nightly  Cont statin, tolerating well  Healthy/low fat diet.  Smoking cessation encouraged  F/u 6 mos with labs

## 2017-11-18 DIAGNOSIS — J44.89 ASTHMA WITH COPD: ICD-10-CM

## 2017-11-20 ENCOUNTER — TELEPHONE (OUTPATIENT)
Dept: PULMONOLOGY | Facility: CLINIC | Age: 42
End: 2017-11-20

## 2017-11-20 RX ORDER — FLUTICASONE FUROATE AND VILANTEROL 200; 25 UG/1; UG/1
1 POWDER RESPIRATORY (INHALATION) DAILY
Qty: 60 EACH | Refills: 0 | Status: SHIPPED | OUTPATIENT
Start: 2017-11-20 | End: 2017-12-28 | Stop reason: SDUPTHER

## 2017-11-20 NOTE — TELEPHONE ENCOUNTER
One refill from pulmonary for Breo controller, will need follow up before additional refills or may obtain future refills from his Primary Care Provider.

## 2017-12-28 DIAGNOSIS — J44.89 ASTHMA WITH COPD: ICD-10-CM

## 2018-01-02 RX ORDER — FLUTICASONE FUROATE AND VILANTEROL TRIFENATATE 200; 25 UG/1; UG/1
POWDER RESPIRATORY (INHALATION)
Qty: 60 EACH | Refills: 11 | Status: SHIPPED | OUTPATIENT
Start: 2018-01-02 | End: 2019-01-07 | Stop reason: SDUPTHER

## 2018-02-13 DIAGNOSIS — K21.9 GASTROESOPHAGEAL REFLUX DISEASE, ESOPHAGITIS PRESENCE NOT SPECIFIED: ICD-10-CM

## 2018-02-13 DIAGNOSIS — I10 ESSENTIAL HYPERTENSION: ICD-10-CM

## 2018-02-13 RX ORDER — PANTOPRAZOLE SODIUM 40 MG/1
TABLET, DELAYED RELEASE ORAL
Qty: 90 TABLET | Refills: 0 | Status: SHIPPED | OUTPATIENT
Start: 2018-02-13 | End: 2018-05-19 | Stop reason: SDUPTHER

## 2018-02-13 RX ORDER — LOSARTAN POTASSIUM 50 MG/1
TABLET ORAL
Qty: 90 TABLET | Refills: 0 | Status: SHIPPED | OUTPATIENT
Start: 2018-02-13 | End: 2018-05-19 | Stop reason: SDUPTHER

## 2018-02-16 ENCOUNTER — OFFICE VISIT (OUTPATIENT)
Dept: INTERNAL MEDICINE | Facility: CLINIC | Age: 43
End: 2018-02-16
Payer: MEDICARE

## 2018-02-16 VITALS
TEMPERATURE: 98 F | HEIGHT: 72 IN | WEIGHT: 190.06 LBS | BODY MASS INDEX: 25.74 KG/M2 | SYSTOLIC BLOOD PRESSURE: 118 MMHG | DIASTOLIC BLOOD PRESSURE: 80 MMHG | HEART RATE: 76 BPM

## 2018-02-16 DIAGNOSIS — I10 ESSENTIAL HYPERTENSION: ICD-10-CM

## 2018-02-16 DIAGNOSIS — K21.9 GASTROESOPHAGEAL REFLUX DISEASE, ESOPHAGITIS PRESENCE NOT SPECIFIED: ICD-10-CM

## 2018-02-16 DIAGNOSIS — M23.91 ACUTE INTERNAL DERANGEMENT OF RIGHT KNEE: ICD-10-CM

## 2018-02-16 DIAGNOSIS — Z01.818 PRE-OP EVALUATION: Primary | ICD-10-CM

## 2018-02-16 DIAGNOSIS — D72.829 LEUKOCYTOSIS, UNSPECIFIED TYPE: ICD-10-CM

## 2018-02-16 DIAGNOSIS — J44.89 COPD WITH ASTHMA: ICD-10-CM

## 2018-02-16 PROCEDURE — 99999 PR PBB SHADOW E&M-EST. PATIENT-LVL IV: CPT | Mod: PBBFAC,,, | Performed by: PHYSICIAN ASSISTANT

## 2018-02-16 PROCEDURE — 99214 OFFICE O/P EST MOD 30 MIN: CPT | Mod: PBBFAC,PO | Performed by: PHYSICIAN ASSISTANT

## 2018-02-16 PROCEDURE — 99214 OFFICE O/P EST MOD 30 MIN: CPT | Mod: S$PBB,,, | Performed by: PHYSICIAN ASSISTANT

## 2018-02-16 NOTE — Clinical Note
Please add stat xray to pts lab visit Monday. This is due to history of COPD. And please check if he is still a smoker, thanks

## 2018-02-19 ENCOUNTER — HOSPITAL ENCOUNTER (OUTPATIENT)
Dept: RADIOLOGY | Facility: HOSPITAL | Age: 43
Discharge: HOME | End: 2018-02-19
Attending: PHYSICIAN ASSISTANT
Payer: MEDICARE

## 2018-02-19 ENCOUNTER — NURSE TRIAGE (OUTPATIENT)
Dept: ADMINISTRATIVE | Facility: CLINIC | Age: 43
End: 2018-02-19

## 2018-02-19 ENCOUNTER — TELEPHONE (OUTPATIENT)
Dept: INTERNAL MEDICINE | Facility: CLINIC | Age: 43
End: 2018-02-19

## 2018-02-19 DIAGNOSIS — Z01.818 PRE-OP EVALUATION: ICD-10-CM

## 2018-02-19 PROCEDURE — 71046 X-RAY EXAM CHEST 2 VIEWS: CPT | Mod: TC,FY,PO

## 2018-02-19 PROCEDURE — 71046 X-RAY EXAM CHEST 2 VIEWS: CPT | Mod: 26,,, | Performed by: RADIOLOGY

## 2018-02-19 NOTE — TELEPHONE ENCOUNTER
----- Message from Brianne Veronica sent at 2/19/2018  2:04 PM CST -----  Contact: pt  Pt calling for test results.

## 2018-02-19 NOTE — TELEPHONE ENCOUNTER
----- Message from AMILCAR Burgess sent at 2/16/2018  4:20 PM CST -----  Please add stat xray to pts lab visit Monday. This is due to history of COPD. And please check if he is still a smoker, thanks

## 2018-02-19 NOTE — TELEPHONE ENCOUNTER
----- Message from Niya Rodriguez sent at 2/19/2018  8:34 AM CST -----  Contact: Lotus  from Dr Combs Office  She is calling in regards to she needs a pre-op clearance for the pt and would like to be reached at [phone].

## 2018-02-19 NOTE — TELEPHONE ENCOUNTER
----- Message from Brianne Rebolledo sent at 2/19/2018 11:09 AM CST -----  Contact: dona from dr. roney qiu office [phone]  They are needing some notes from preop clearance. Pt is schedule for surgery tomorrow.please fax at [phone].

## 2018-02-19 NOTE — TELEPHONE ENCOUNTER
----- Message from Vanita Ram sent at 2/19/2018  4:31 PM CST -----  Patient requesting test results. Please adv/call [phone].//thanks. cw

## 2018-02-20 NOTE — TELEPHONE ENCOUNTER
Reason for Disposition   Nursing judgment    Protocols used: ST NO GUIDELINE OR REFERENCE [id number]    Pt states he was supposed to receive CBC results prior to surgery tomorrow.  Pt states he never received results per PCP office.  Pt advised to go to surgery tomorrow at scheduled time.  Pt advised per protocol and verbalizes understanding.

## 2018-02-20 NOTE — TELEPHONE ENCOUNTER
----- Message from Marti Valdez sent at 2/19/2018  4:57 PM CST -----  Contact: Adilene ( pre admit at [facility] )   Adilene ( pre admit at [facility] ) is requesting a copy of the pt medical clearance. Pt has surgery in the morning           Please call Adilene ( pre admit at [facility] )  back at [phone] (office )  or [phone] ( fax )

## 2018-02-20 NOTE — TELEPHONE ENCOUNTER
Pt's surgery clearance was pending lab results. Lab results are in. Melanie will review this morning.

## 2018-03-28 ENCOUNTER — TELEPHONE (OUTPATIENT)
Dept: INTERNAL MEDICINE | Facility: CLINIC | Age: 43
End: 2018-03-28

## 2018-03-28 NOTE — TELEPHONE ENCOUNTER
Called and left message that need to call back to reschedule his 6 mo f/u and labs that he missed.

## 2018-05-18 RX ORDER — ATORVASTATIN CALCIUM 40 MG/1
40 TABLET, FILM COATED ORAL DAILY
Qty: 90 TABLET | Refills: 1 | Status: SHIPPED | OUTPATIENT
Start: 2018-05-18

## 2018-05-18 NOTE — TELEPHONE ENCOUNTER
Patient is requesting refill on atorvastatin 40mg. Pharmacy verified ([facility]).//ddw

## 2018-05-19 DIAGNOSIS — I10 ESSENTIAL HYPERTENSION: ICD-10-CM

## 2018-05-19 DIAGNOSIS — K21.9 GASTROESOPHAGEAL REFLUX DISEASE, ESOPHAGITIS PRESENCE NOT SPECIFIED: ICD-10-CM

## 2018-05-21 RX ORDER — LOSARTAN POTASSIUM 50 MG/1
TABLET ORAL
Qty: 90 TABLET | Refills: 1 | Status: SHIPPED | OUTPATIENT
Start: 2018-05-21 | End: 2018-11-10 | Stop reason: SDUPTHER

## 2018-05-21 RX ORDER — PANTOPRAZOLE SODIUM 40 MG/1
TABLET, DELAYED RELEASE ORAL
Qty: 90 TABLET | Refills: 1 | Status: SHIPPED | OUTPATIENT
Start: 2018-05-21 | End: 2018-11-10 | Stop reason: SDUPTHER

## 2018-06-25 ENCOUNTER — TELEPHONE (OUTPATIENT)
Dept: INTERNAL MEDICINE | Facility: CLINIC | Age: 43
End: 2018-06-25

## 2018-06-25 NOTE — TELEPHONE ENCOUNTER
Tried calling pt to reschedule his 6 mo f/u and labs that he missed in March, he is not accepting calls at this time.

## 2018-09-17 ENCOUNTER — OFFICE VISIT (OUTPATIENT)
Dept: URGENT CARE | Facility: CLINIC | Age: 43
End: 2018-09-17
Payer: MEDICARE

## 2018-09-17 VITALS
DIASTOLIC BLOOD PRESSURE: 94 MMHG | WEIGHT: 188.06 LBS | HEIGHT: 72 IN | TEMPERATURE: 99 F | OXYGEN SATURATION: 97 % | RESPIRATION RATE: 20 BRPM | BODY MASS INDEX: 25.47 KG/M2 | HEART RATE: 88 BPM | SYSTOLIC BLOOD PRESSURE: 146 MMHG

## 2018-09-17 DIAGNOSIS — L02.413 ABSCESS OF ARM, RIGHT: Primary | ICD-10-CM

## 2018-09-17 PROCEDURE — 99999 PR PBB SHADOW E&M-EST. PATIENT-LVL IV: CPT | Mod: PBBFAC,,, | Performed by: NURSE PRACTITIONER

## 2018-09-17 PROCEDURE — 99214 OFFICE O/P EST MOD 30 MIN: CPT | Mod: PBBFAC,PO | Performed by: NURSE PRACTITIONER

## 2018-09-17 PROCEDURE — 99214 OFFICE O/P EST MOD 30 MIN: CPT | Mod: S$PBB,,, | Performed by: NURSE PRACTITIONER

## 2018-09-17 RX ORDER — CLINDAMYCIN HYDROCHLORIDE 300 MG/1
300 CAPSULE ORAL 3 TIMES DAILY
Qty: 30 CAPSULE | Refills: 0 | Status: SHIPPED | OUTPATIENT
Start: 2018-09-17 | End: 2018-09-27

## 2018-09-17 NOTE — PATIENT INSTRUCTIONS
Abscess (Antibiotic Treatment Only)  An abscess (sometimes called a boil) happens when bacteria get trapped under the skin and start to grow. Pus forms inside the abscess as the body responds to the bacteria. An abscess can happen with an insect bite, ingrown hair, blocked oil gland, pimple, cyst, or puncture wound.  In the early stages, your wound may be red and tender. For this stage, you may get antibiotics. If the abscess does not get better with antibiotics, it will need to be drained with a small cut.  Home care  These tips will help you care for your abscess at home:  · Soak the wound in hot water or apply hot packs (small towel soaked in hot water) to the area for 20 minutes at a time. Do this 3 to 4 times a day.  · Do not cut, squeeze, or pop the boil yourself.  · Apply antibiotic cream or ointment to the skin 3 to 4 times a day, unless something else was prescribed. Some ointments include an antibiotic plus a pain reliever.  · If your doctor prescribed antibiotics, do not stop taking them until you have finished the medicine or the doctor tells you to stop.  · You may use an over-the-counter pain medicine to control pain, unless another pain medicine was prescribed. If you have chronic liver or kidney disease or ever had a stomach ulcer or gastrointestinal bleeding, talk with your doctor before using these any of these.  Follow-up care  Follow up with your healthcare provider, or as advised. Check your wound each day for the signs of worsening infection listed below.  When to seek medical advice  Get prompt medical attention if any of these occur:  · An increase in redness or swelling  · Red streaks in the skin leading away from the abscess  · An increase in local pain or swelling  · Fever of 100.4ºF (38ºC) or higher, or as directed by your healthcare provider  · Pus or fluid coming from the abscess  · Boil returns after getting better  Date Last Reviewed: 9/1/2016  © 9065-9275 [employer] [address] [place] [address]. All rights reserved. This information is not intended as a substitute for professional medical care. Always follow your healthcare professional's instructions.

## 2018-09-17 NOTE — PROGRESS NOTES
Subjective:       Patient ID: Moris Palmer Jr. is a 42 y.o. male.    Chief Complaint: Rash    Pt is a 42 year old male to clinic today with complaints of infection to his right arm that began Wednesday.       Abscess   Chronicity:  RecurrentProgression Since Onset: gradually worsening  Size:  Less than 2 cm  Location:  Shoulder/arm  Associated Symptoms: no fever, no chills  Characteristics: painful, redness and swelling    Pain Scale:  5/10  Treatments Tried:  Topical antibiotics  Relieved by:  Nothing  Worsened by:  Nothing    Review of Systems   Constitutional: Negative for chills, diaphoresis, fatigue and fever.   Skin: Positive for color change (erythema right forearm). Negative for rash and wound.   Neurological: Negative for dizziness, light-headedness and headaches.       Objective:      Physical Exam   Constitutional: He is oriented to person, place, and time. He appears well-developed and well-nourished. No distress.   HENT:   Head: Normocephalic.   Right Ear: External ear normal.   Left Ear: External ear normal.   Nose: Nose normal.   Eyes: Pupils are equal, round, and reactive to light.   Musculoskeletal: Normal range of motion. He exhibits tenderness. He exhibits no edema or deformity.        Right forearm: He exhibits tenderness and swelling. He exhibits no bony tenderness, no edema, no deformity and no laceration.        Arms:  Neurological: He is alert and oriented to person, place, and time.   Skin: Skin is warm and dry. No rash noted. He is not diaphoretic. There is erythema.        Psychiatric: He has a normal mood and affect. His speech is normal and behavior is normal. Thought content normal.   Nursing note and vitals reviewed.      Assessment:       1. Abscess of arm, right        Plan:   Abscess of arm, right  -     clindamycin (CLEOCIN) 300 MG capsule; Take 1 capsule (300 mg total) by mouth 3 (three) times daily. for 10 days  Dispense: 30 capsule; Refill: 0      Use Dial antibacterial  Soap daily.  Bactroban ointment to affected area with Q-tip twice daily x 7 days.  Apply heat to area every 2 hours to promote drainage and healing.  Take all antibiotics for full course.  To prevent spread of infection to others in household, cleanse tub/shower with bleach solution.  If the area of redness spreads, you develop fever 100.4 or greater, swelling or pain worsens, contact PCP or go to ER immediately.

## 2018-10-22 ENCOUNTER — IMMUNIZATION (OUTPATIENT)
Dept: INTERNAL MEDICINE | Facility: CLINIC | Age: 43
End: 2018-10-22
Payer: MEDICARE

## 2018-10-22 PROCEDURE — 90686 IIV4 VACC NO PRSV 0.5 ML IM: CPT | Mod: PBBFAC,PO

## 2018-11-10 DIAGNOSIS — K21.9 GASTROESOPHAGEAL REFLUX DISEASE, ESOPHAGITIS PRESENCE NOT SPECIFIED: ICD-10-CM

## 2018-11-10 DIAGNOSIS — I10 ESSENTIAL HYPERTENSION: ICD-10-CM

## 2018-11-11 DIAGNOSIS — I10 ESSENTIAL HYPERTENSION: ICD-10-CM

## 2018-11-11 DIAGNOSIS — K21.9 GASTROESOPHAGEAL REFLUX DISEASE, ESOPHAGITIS PRESENCE NOT SPECIFIED: ICD-10-CM

## 2018-11-11 RX ORDER — PANTOPRAZOLE SODIUM 40 MG/1
TABLET, DELAYED RELEASE ORAL
Qty: 90 TABLET | Refills: 1 | Status: SHIPPED | OUTPATIENT
Start: 2018-11-11

## 2018-11-11 RX ORDER — LOSARTAN POTASSIUM 50 MG/1
TABLET ORAL
Qty: 30 TABLET | Refills: 1 | Status: SHIPPED | OUTPATIENT
Start: 2018-11-11 | End: 2018-11-11 | Stop reason: SDUPTHER

## 2018-11-11 RX ORDER — LOSARTAN POTASSIUM 50 MG/1
TABLET ORAL
Qty: 90 TABLET | Refills: 1 | Status: SHIPPED | OUTPATIENT
Start: 2018-11-11

## 2018-11-11 RX ORDER — PANTOPRAZOLE SODIUM 40 MG/1
TABLET, DELAYED RELEASE ORAL
Qty: 30 TABLET | Refills: 1 | Status: SHIPPED | OUTPATIENT
Start: 2018-11-11 | End: 2018-11-11 | Stop reason: SDUPTHER

## 2018-11-11 NOTE — TELEPHONE ENCOUNTER
Patient has not been seen in a year or longer, will give one month of meds but appt needs to be made for follow up.

## 2018-11-14 NOTE — TELEPHONE ENCOUNTER
Called and left message to call back.  Tried calling his emergency contact, his mother, at both numbers and one is no longer working and the other she does not have voicemail set up.  Mailed letter to pt.

## 2018-11-15 ENCOUNTER — OFFICE VISIT (OUTPATIENT)
Dept: URGENT CARE | Facility: CLINIC | Age: 43
End: 2018-11-15
Payer: MEDICARE

## 2018-11-15 VITALS
RESPIRATION RATE: 16 BRPM | HEART RATE: 89 BPM | WEIGHT: 189.63 LBS | OXYGEN SATURATION: 96 % | HEIGHT: 72 IN | DIASTOLIC BLOOD PRESSURE: 84 MMHG | TEMPERATURE: 97 F | SYSTOLIC BLOOD PRESSURE: 120 MMHG | BODY MASS INDEX: 25.68 KG/M2

## 2018-11-15 DIAGNOSIS — R05.9 COUGH: ICD-10-CM

## 2018-11-15 DIAGNOSIS — J32.9 SINUSITIS, UNSPECIFIED CHRONICITY, UNSPECIFIED LOCATION: Primary | ICD-10-CM

## 2018-11-15 PROCEDURE — 99999 PR PBB SHADOW E&M-EST. PATIENT-LVL IV: CPT | Mod: PBBFAC,,, | Performed by: NURSE PRACTITIONER

## 2018-11-15 PROCEDURE — 99214 OFFICE O/P EST MOD 30 MIN: CPT | Mod: S$PBB,,, | Performed by: NURSE PRACTITIONER

## 2018-11-15 PROCEDURE — 99214 OFFICE O/P EST MOD 30 MIN: CPT | Mod: PBBFAC,PO | Performed by: NURSE PRACTITIONER

## 2018-11-15 RX ORDER — DOXYCYCLINE 100 MG/1
100 CAPSULE ORAL EVERY 12 HOURS
Qty: 14 CAPSULE | Refills: 0 | Status: SHIPPED | OUTPATIENT
Start: 2018-11-15 | End: 2018-11-22

## 2018-11-15 NOTE — PROGRESS NOTES
Subjective:       Patient ID: Moris Palmer Jr. is a 42 y.o. male.    Chief Complaint: Cough    Pt is a 42 year old male to clinic today with complaints of a productive cough and PND that began 2 weeks ago.       Sinus Problem   This is a recurrent problem. The current episode started 1 to 4 weeks ago. The problem has been gradually worsening since onset. There has been no fever. His pain is at a severity of 3/10. The pain is mild. Associated symptoms include coughing. Pertinent negatives include no chills, congestion, diaphoresis, ear pain, headaches, hoarse voice, neck pain, shortness of breath, sinus pressure, sneezing, sore throat or swollen glands. Past treatments include nothing.     Review of Systems   Constitutional: Negative for chills, diaphoresis, fatigue and fever.   HENT: Positive for postnasal drip. Negative for congestion, ear discharge, ear pain, hoarse voice, rhinorrhea, sinus pressure, sinus pain, sneezing, sore throat and trouble swallowing.    Eyes: Negative for pain.   Respiratory: Positive for cough and wheezing. Negative for chest tightness and shortness of breath.    Cardiovascular: Negative for chest pain and palpitations.   Gastrointestinal: Negative for abdominal pain, diarrhea, nausea and vomiting.   Musculoskeletal: Negative for back pain, myalgias and neck pain.   Skin: Negative for rash.   Neurological: Negative for dizziness, light-headedness and headaches.       Objective:      Physical Exam   Constitutional: He is oriented to person, place, and time. He appears well-developed and well-nourished. No distress.   HENT:   Head: Normocephalic.   Right Ear: Tympanic membrane, external ear and ear canal normal. No tenderness. Tympanic membrane is not bulging.   Left Ear: Tympanic membrane, external ear and ear canal normal. No tenderness. Tympanic membrane is not bulging.   Nose: Mucosal edema present. No rhinorrhea. Right sinus exhibits frontal sinus tenderness. Right sinus  exhibits no maxillary sinus tenderness. Left sinus exhibits frontal sinus tenderness. Left sinus exhibits no maxillary sinus tenderness.   Mouth/Throat: Uvula is midline, oropharynx is clear and moist and mucous membranes are normal. No oropharyngeal exudate, posterior oropharyngeal edema or posterior oropharyngeal erythema.   PND noted   Eyes: Conjunctivae and EOM are normal. Pupils are equal, round, and reactive to light.   Neck: Normal range of motion. Neck supple.   Cardiovascular: Normal rate, regular rhythm and normal heart sounds. Exam reveals no gallop and no friction rub.   No murmur heard.  Pulmonary/Chest: Effort normal and breath sounds normal. No accessory muscle usage or stridor. No apnea, no tachypnea and no bradypnea. No respiratory distress. He has no decreased breath sounds. He has no wheezes. He has no rhonchi. He has no rales.   Lymphadenopathy:        Head (right side): No submental, no submandibular and no tonsillar adenopathy present.        Head (left side): No submental, no submandibular and no tonsillar adenopathy present.     He has no cervical adenopathy.   Neurological: He is alert and oriented to person, place, and time.   Skin: Skin is warm and dry. No rash noted. He is not diaphoretic.   Psychiatric: He has a normal mood and affect. His speech is normal and behavior is normal. Thought content normal.   Nursing note and vitals reviewed.      Assessment:       1. Sinusitis, unspecified chronicity, unspecified location    2. Cough        Plan:   Sinusitis, unspecified chronicity, unspecified location  -     doxycycline (VIBRAMYCIN) 100 MG Cap; Take 1 capsule (100 mg total) by mouth every 12 (twelve) hours. for 7 days  Dispense: 14 capsule; Refill: 0    Cough      · Rest and increase fluids.   · May apply warm compresses as needed.   · Saline nasal spray or saline irrigation (Neti pot) to loosen nasal congestion.  · Flonase or Nasacort to reduce inflammation in the sinus cavities.  · Take  antibiotics exactly as prescribed. Make sure to complete the entire course of antibiotics even if you start feeling better. This will prevent recurrence of your infection and bacterial resistance.   · Do not drive, drink alcohol, or take any other sedating medications or substances while taking cough syrup.   · Follow up with your primary care provider or with ENT if not improved within a few days or sooner for any new or worsening symptoms.   · Go to the ER for any fever that does not improve with Tylenol/Ibuprofen, neck stiffness, rash, severe headache, vision changes, shortness of breath, chest pain, severe facial pain or swelling, or for any other new and concerning symptoms.

## 2018-11-15 NOTE — PATIENT INSTRUCTIONS
Sinusitis (Antibiotic Treatment)    The sinuses are air-filled spaces within the bones of the face. They connect to the inside of the nose. Sinusitis is an inflammation of the tissue lining the sinus cavity. Sinus inflammation can occur during a cold. It can also be due to allergies to pollens and other particles in the air. Sinusitis can cause symptoms of sinus congestion and fullness. A sinus infection causes fever, headache and facial pain. There is often green or yellow drainage from the nose or into the back of the throat (post-nasal drip). You have been given antibiotics to treat this condition.  Home care:  · Take the full course of antibiotics as instructed. Do not stop taking them, even if you feel better.  · Drink plenty of water, hot tea, and other liquids. This may help thin mucus. It also may promote sinus drainage.  · Heat may help soothe painful areas of the face. Use a towel soaked in hot water. Or,  the shower and direct the hot spray onto your face. Using a vaporizer along with a menthol rub at night may also help.   · An expectorant containing guaifenesin may help thin the mucus and promote drainage from the sinuses.  · Over-the-counter decongestants may be used unless a similar medicine was prescribed. Nasal sprays work the fastest. Use one that contains phenylephrine or oxymetazoline. First blow the nose gently. Then use the spray. Do not use these medicines more often than directed on the label or symptoms may get worse. You may also use tablets containing pseudoephedrine. Avoid products that combine ingredients, because side effects may be increased. Read labels. You can also ask the pharmacist for help. (NOTE: Persons with high blood pressure should not use decongestants. They can raise blood pressure.)  · Over-the-counter antihistamines may help if allergies contributed to your sinusitis.    · Do not use nasal rinses or irrigation during an acute sinus infection, unless told to by  your health care provider. Rinsing may spread the infection to other sinuses.  · Use acetaminophen or ibuprofen to control pain, unless another pain medicine was prescribed. (If you have chronic liver or kidney disease or ever had a stomach ulcer, talk with your doctor before using these medicines. Aspirin should never be used in anyone under 18 years of age who is ill with a fever. It may cause severe liver damage.)  · Don't smoke. This can worsen symptoms.  Follow-up care  Follow up with your healthcare provider or our staff if you are not improving within the next week.  When to seek medical advice  Call your healthcare provider if any of these occur:  · Facial pain or headache becoming more severe  · Stiff neck  · Unusual drowsiness or confusion  · Swelling of the forehead or eyelids  · Vision problems, including blurred or double vision  · Fever of 100.4ºF (38ºC) or higher, or as directed by your healthcare provider  · Seizure  · Breathing problems  · Symptoms not resolving within 10 days  Date Last Reviewed: 4/13/2015  © 9514-5874 [employer]. [address] [place] [address], [place] [address]. All rights reserved. This information is not intended as a substitute for professional medical care. Always follow your healthcare professional's instructions.

## 2018-11-19 DIAGNOSIS — I10 ESSENTIAL HYPERTENSION: ICD-10-CM

## 2018-11-19 DIAGNOSIS — K21.9 GASTROESOPHAGEAL REFLUX DISEASE, ESOPHAGITIS PRESENCE NOT SPECIFIED: ICD-10-CM

## 2018-11-19 RX ORDER — LOSARTAN POTASSIUM 50 MG/1
TABLET ORAL
Qty: 30 TABLET | Refills: 0 | Status: SHIPPED | OUTPATIENT
Start: 2018-11-19

## 2018-11-19 RX ORDER — PANTOPRAZOLE SODIUM 40 MG/1
TABLET, DELAYED RELEASE ORAL
Qty: 30 TABLET | Refills: 0 | Status: SHIPPED | OUTPATIENT
Start: 2018-11-19

## 2019-01-07 DIAGNOSIS — J44.89 ASTHMA WITH COPD: ICD-10-CM

## 2019-01-07 RX ORDER — FLUTICASONE FUROATE AND VILANTEROL TRIFENATATE 200; 25 UG/1; UG/1
POWDER RESPIRATORY (INHALATION)
Qty: 60 EACH | Refills: 1 | Status: SHIPPED | OUTPATIENT
Start: 2019-01-07

## 2019-04-22 ENCOUNTER — PATIENT OUTREACH (OUTPATIENT)
Dept: ADMINISTRATIVE | Facility: HOSPITAL | Age: 44
End: 2019-04-22

## 2019-05-31 ENCOUNTER — TELEPHONE (OUTPATIENT)
Dept: INTERNAL MEDICINE | Facility: CLINIC | Age: 44
End: 2019-05-31

## 2019-06-26 ENCOUNTER — PATIENT OUTREACH (OUTPATIENT)
Dept: ADMINISTRATIVE | Facility: HOSPITAL | Age: 44
End: 2019-06-26

## 2019-06-26 NOTE — PROGRESS NOTES
Called patient to schedule appt for annul or find out if hes changed PCP's. No answer left detailed message. Pt is also on the uncontrolled HTN report.

## 2024-02-28 NOTE — PROGRESS NOTES
I spoke with Sergey Mendoza and updated pt on results/recommendations from provider.  Pt verbalized understanding and has no further questions at this time.    Thank you,    Salud SMITH, RN  Triage [facility]  02/28/24 10:13 EST     Subjective:       Patient ID: Moris Palmer Jr. is a 42 y.o. male.    Chief Complaint: Patient comes in today for pre op   HPI  He is a 43 yo male, wanting to undergo right knee arthroscopy due to internal chronic knee pain   Seeing Dr. Combs  He has had labs run   WBC count a little elevated   He states he had a steroid shot in knee 3 weeks ago.     Patient has no complaints today   Has hypertension that is well control, chronic LBP, GERD and a history of COPD/asthma. Last PFT in 2015. Does not follow with pulmonary   Uses BREO every morning and has no issues with cough/sob/fatigue.     No cardiac diseases. No signs and symptoms of cardiac or pulmonary decompensation     Has had plenty of surgeries in the past without issues  No history of DVT/PE   Not on blood thinners        Past Medical History:   Diagnosis Date    Allergy     Anxiety     Asthma     Chronic low back pain     COPD (chronic obstructive pulmonary disease)     Depression     Hyperlipidemia     Hypertension      Family History   Problem Relation Age of Onset    Depression Mother     Anxiety disorder Mother     Diabetes Paternal Grandmother     Heart disease Neg Hx     Stroke Neg Hx     Colon cancer Neg Hx     Prostate cancer Neg Hx      Social History     Social History    Marital status:      Spouse name: N/A    Number of children: 1    Years of education: N/A     Occupational History    Disability      Social History Main Topics    Smoking status: Current Every Day Smoker     Packs/day: 0.15     Years: 20.00     Types: Cigarettes    Smokeless tobacco: Never Used    Alcohol use 0.0 oz/week      Comment: Occ use    Drug use: No    Sexual activity: Not Currently     Partners: Female     Other Topics Concern    Not on file     Social History Narrative    No narrative on file       Lab Results   Component Value Date    WBC 10.17 03/20/2017    HGB 14.9 03/20/2017    HCT 46.2 03/20/2017     03/20/2017    CHOL  141 08/17/2017    TRIG 124 08/17/2017    HDL 46 08/17/2017    ALT 18 08/17/2017    AST 15 08/17/2017     08/17/2017    K 4.7 08/17/2017     08/17/2017    CREATININE 0.9 08/17/2017    BUN 12 08/17/2017    CO2 25 08/17/2017    TSH 1.086 03/20/2017    HGBA1C 5.7 09/10/2015         Review of Systems   Constitutional: Negative for activity change, fatigue, fever and unexpected weight change.   HENT: Negative for facial swelling, trouble swallowing and voice change.    Eyes: Negative for visual disturbance.   Respiratory: Negative for cough, chest tightness and shortness of breath.    Cardiovascular: Negative for chest pain and leg swelling.   Gastrointestinal: Negative for abdominal distention, abdominal pain and blood in stool.   Endocrine: Negative for polydipsia and polyuria.   Genitourinary: Negative for difficulty urinating, flank pain and penile pain.   Musculoskeletal: Negative for arthralgias and back pain.   Skin: Negative for color change and rash.   Allergic/Immunologic: Negative.  Negative for immunocompromised state.   Neurological: Negative for dizziness, facial asymmetry and speech difficulty.   Hematological: Negative for adenopathy. Does not bruise/bleed easily.   Psychiatric/Behavioral: Negative for agitation and suicidal ideas.       Objective:      Physical Exam   Constitutional: He is oriented to person, place, and time. He appears well-developed and well-nourished. No distress.   HENT:   Head: Normocephalic and atraumatic.   Right Ear: External ear normal.   Left Ear: External ear normal.   Nose: Nose normal.   Mouth/Throat: Oropharynx is clear and moist. No oropharyngeal exudate.   TMs normal    Eyes: Conjunctivae and EOM are normal. Pupils are equal, round, and reactive to light.   Neck: Normal range of motion. Neck supple.   Cardiovascular: Normal rate, regular rhythm and normal heart sounds.    Pulmonary/Chest: Effort normal and breath sounds normal.   Abdominal: Soft. Bowel sounds  are normal.   Genitourinary:   Genitourinary Comments: Exam not done    Musculoskeletal: Normal range of motion.   Neurological: He is alert and oriented to person, place, and time. He has normal reflexes.   Skin: Skin is warm.   Psychiatric: He has a normal mood and affect. His behavior is normal. Judgment and thought content normal.   Vitals reviewed.      Assessment:       1. Pre-op evaluation    2. Acute internal derangement of right knee    3. Leukocytosis, unspecified type    4. Essential hypertension    5. Gastroesophageal reflux disease, esophagitis presence not specified    6. COPD with asthma        Plan:       Pre-op evaluation  -     X-Ray Chest PA And Lateral; Future; Expected date: 02/16/2018    Acute internal derangement of right knee    Leukocytosis, unspecified type  -     CBC auto differential; Future; Expected date: 02/16/2018    Essential hypertension    Gastroesophageal reflux disease, esophagitis presence not specified    COPD with asthma          I have reviewed the patient's past medical history and physical exam findings.  I make the following recommendations at this time.     Overall the patient presents as a good cardiac candidate no cardaic issues besides HTN. which is well controlled.      From a pulmonary standpoint patient presents as a good candidate for surgery.  He has underlying COPD which he taktes BREO cor and no symptoms at this time. So special attention to this issue needed if doing general anesthesia.  Good pulmonary toilet, incentive spirometry, and early ambulation are recommended to maximize pulmonary outcome.     Need repeat lab given elevated WBC count. Will do this STAT Monday     DVT prophylaxis as per standard.     Patient will avoid aspirin and anti-inflammatory products starting 1 week prior to surgery.